# Patient Record
Sex: FEMALE | Race: WHITE
[De-identification: names, ages, dates, MRNs, and addresses within clinical notes are randomized per-mention and may not be internally consistent; named-entity substitution may affect disease eponyms.]

---

## 2017-03-24 ENCOUNTER — HOSPITAL ENCOUNTER (INPATIENT)
Dept: HOSPITAL 61 - ER | Age: 82
LOS: 5 days | Discharge: NURSING, REHAB OR LONG-TERM CARE | DRG: 470 | End: 2017-03-29
Attending: INTERNAL MEDICINE | Admitting: INTERNAL MEDICINE
Payer: MEDICARE

## 2017-03-24 VITALS — HEIGHT: 66 IN | BODY MASS INDEX: 29.34 KG/M2 | WEIGHT: 182.56 LBS
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
## 2017-03-24 DIAGNOSIS — E03.9: ICD-10-CM
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
## 2017-03-24 DIAGNOSIS — M81.0: ICD-10-CM
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
## 2017-03-24 DIAGNOSIS — Z60.2: ICD-10-CM

## 2017-03-24 DIAGNOSIS — Z91.018: ICD-10-CM

## 2017-03-24 DIAGNOSIS — Z88.0: ICD-10-CM

## 2017-03-24 DIAGNOSIS — S42.002A: ICD-10-CM

## 2017-03-24 DIAGNOSIS — S72.002A: Primary | ICD-10-CM

## 2017-03-24 DIAGNOSIS — Z88.8: ICD-10-CM

## 2017-03-24 DIAGNOSIS — I10: ICD-10-CM

## 2017-03-24 LAB
ANION GAP SERPL CALC-SCNC: 10 MMOL/L (ref 6–14)
BACTERIA #/AREA URNS HPF: (no result) /HPF
BASOPHILS # BLD AUTO: 0 X10^3/UL (ref 0–0.2)
BASOPHILS NFR BLD: 0 % (ref 0–3)
BILIRUB UR QL STRIP: NEGATIVE
BUN SERPL-MCNC: 25 MG/DL (ref 7–20)
CALCIUM SERPL-MCNC: 8.8 MG/DL (ref 8.5–10.1)
CHLORIDE SERPL-SCNC: 105 MMOL/L (ref 98–107)
CO2 SERPL-SCNC: 27 MMOL/L (ref 21–32)
CREAT SERPL-MCNC: 1 MG/DL (ref 0.6–1)
EOSINOPHIL NFR BLD: 2 % (ref 0–3)
ERYTHROCYTE [DISTWIDTH] IN BLOOD BY AUTOMATED COUNT: 14.7 % (ref 11.5–14.5)
GFR SERPLBLD BASED ON 1.73 SQ M-ARVRAT: 52.4 ML/MIN
GLUCOSE SERPL-MCNC: 122 MG/DL (ref 70–99)
GLUCOSE UR STRIP-MCNC: NEGATIVE MG/DL
HCT VFR BLD CALC: 39.7 % (ref 36–47)
HGB BLD-MCNC: 13.3 G/DL (ref 12–15.5)
LYMPHOCYTES # BLD: 1.5 X10^3/UL (ref 1–4.8)
LYMPHOCYTES NFR BLD AUTO: 20 % (ref 24–48)
MCH RBC QN AUTO: 29 PG (ref 25–35)
MCHC RBC AUTO-ENTMCNC: 34 G/DL (ref 31–37)
MCV RBC AUTO: 87 FL (ref 79–100)
MONOCYTES NFR BLD: 11 % (ref 0–9)
NEUTROPHILS NFR BLD AUTO: 67 % (ref 31–73)
NITRITE UR QL STRIP: NEGATIVE
PH UR STRIP: 7.5 [PH]
PLATELET # BLD AUTO: 185 X10^3/UL (ref 140–400)
POTASSIUM SERPL-SCNC: 4.1 MMOL/L (ref 3.5–5.1)
PROT UR STRIP-MCNC: NEGATIVE MG/DL
RBC # BLD AUTO: 4.55 X10^6/UL (ref 3.5–5.4)
RBC #/AREA URNS HPF: 0 /HPF (ref 0–2)
SODIUM SERPL-SCNC: 142 MMOL/L (ref 136–145)
SP GR UR STRIP: 1.01
SQUAMOUS #/AREA URNS LPF: (no result) /LPF
UROBILINOGEN UR-MCNC: 0.2 MG/DL
WBC # BLD AUTO: 7.6 X10^3/UL (ref 4–11)
WBC #/AREA URNS HPF: (no result) /HPF (ref 0–4)

## 2017-03-24 PROCEDURE — C9113 INJ PANTOPRAZOLE SODIUM, VIA: HCPCS

## 2017-03-24 PROCEDURE — 80048 BASIC METABOLIC PNL TOTAL CA: CPT

## 2017-03-24 PROCEDURE — 82306 VITAMIN D 25 HYDROXY: CPT

## 2017-03-24 PROCEDURE — 88305 TISSUE EXAM BY PATHOLOGIST: CPT

## 2017-03-24 PROCEDURE — 94760 N-INVAS EAR/PLS OXIMETRY 1: CPT

## 2017-03-24 PROCEDURE — 82947 ASSAY GLUCOSE BLOOD QUANT: CPT

## 2017-03-24 PROCEDURE — 87086 URINE CULTURE/COLONY COUNT: CPT

## 2017-03-24 PROCEDURE — 70450 CT HEAD/BRAIN W/O DYE: CPT

## 2017-03-24 PROCEDURE — 36415 COLL VENOUS BLD VENIPUNCTURE: CPT

## 2017-03-24 PROCEDURE — 87641 MR-STAPH DNA AMP PROBE: CPT

## 2017-03-24 PROCEDURE — 93005 ELECTROCARDIOGRAM TRACING: CPT

## 2017-03-24 PROCEDURE — 85014 HEMATOCRIT: CPT

## 2017-03-24 PROCEDURE — 85018 HEMOGLOBIN: CPT

## 2017-03-24 PROCEDURE — 73030 X-RAY EXAM OF SHOULDER: CPT

## 2017-03-24 PROCEDURE — 88311 DECALCIFY TISSUE: CPT

## 2017-03-24 PROCEDURE — 94250: CPT

## 2017-03-24 PROCEDURE — 84484 ASSAY OF TROPONIN QUANT: CPT

## 2017-03-24 PROCEDURE — 71010: CPT

## 2017-03-24 PROCEDURE — 73502 X-RAY EXAM HIP UNI 2-3 VIEWS: CPT

## 2017-03-24 PROCEDURE — 85027 COMPLETE CBC AUTOMATED: CPT

## 2017-03-24 PROCEDURE — 81001 URINALYSIS AUTO W/SCOPE: CPT

## 2017-03-24 PROCEDURE — 94640 AIRWAY INHALATION TREATMENT: CPT

## 2017-03-24 SDOH — SOCIAL STABILITY - SOCIAL INSECURITY: PROBLEMS RELATED TO LIVING ALONE: Z60.2

## 2017-03-24 NOTE — PHYS DOC
Past Medical History


Past Medical History:  Hypertension, Hypothyroid, Other


Additional Past Medical Histor:  graves


Past Surgical History:  Cholecystectomy, Tonsillectomy, Tubal ligation


Additional Past Surgical Histo:  vein strip


Alcohol Use:  None


Drug Use:  None





Adult General


Chief Complaint


Chief Complaint:  MECHANICAL FALL





HPI


HPI


87-year-old female who states she was sleeping at home when her daughter called 

and try to get to the phone and slipped and fell hitting her head and landing 

on her left side in the left shoulder she states she has significant pain in 

her left shoulder as well as her left hip and pelvis area. Patient has not 

tried to ambulate since her fall. She is fully alert and oriented this time but 

has not taken her evening blood pressure medications. She is satting in the mid 

80s upon EMS arrival and required 2 L of oxygen. Upon my initial assessment, 

the patient is in no acute distress but requiring 3 L. She is normally not 

requiring supplemental oxygen. She states she has history of hypertension but 

is otherwise healthy. She does not normally require any ambulatory assistance. 

She does not use a walker or wheelchair. Denies any significant chest pain. She 

localizes all her pain primarily to the left shoulder made worse with movement.





Review of Systems


Review of Systems





Constitutional: Denies fever or chills []


Eyes: Denies change in visual acuity, redness, or eye pain []


HENT: Denies nasal congestion or sore throat []


Respiratory: Denies cough or shortness of breath []


Cardiovascular: No additional information not addressed in HPI []


GI: Denies abdominal pain, nausea, vomiting, bloody stools or diarrhea []


: Denies dysuria or hematuria []


Musculoskeletal: Denies back pain, has joint pain []


Integument: Denies rash or skin lesions []


Neurologic: Denies headache, focal weakness or sensory changes []


Endocrine: Denies polyuria or polydipsia []





Current Medications


Current Medications





 Current Medications








 Medications


  (Trade)  Dose


 Ordered  Sig/Blanco  Start Time


 Stop Time Status Last Admin


Dose Admin


 


 Acetaminophen


  (Tylenol)  650 mg  1X  ONCE  3/24/17 22:00


 3/24/17 22:01 DC 3/24/17 22:07


650 MG











Allergies


Allergies





 Allergies








Coded Allergies Type Severity Reaction Last Updated Verified


 


  Penicillins Allergy Intermediate  8/22/16 Yes


 


  ibuprofen Allergy Intermediate  8/22/16 Yes











Physical Exam


Physical Exam





Constitutional: Well developed, well nourished, no acute distress, non-toxic 

appearance. []


HENT: Normocephalic, atraumatic, bilateral external ears normal, oropharynx 

moist, no oral exudates, nose normal. []


Eyes: PERRLA, EOMI, conjunctiva normal, no discharge. [] 


Neck: Normal range of motion, no tenderness, supple, no stridor. [] 


Cardiovascular:Heart rate regular rhythm, no murmur []


Lungs & Thorax:  Bilateral breath sounds clear to auscultation, there is no 

crepitus or deformity of the chest []


Abdomen: Bowel sounds normal, soft, no tenderness, no masses, no pulsatile 

masses. [] 


Skin: Warm, dry, no erythema, no rash. [] 


Back: No tenderness, no CVA tenderness. [] 


Extremities: There is no obvious deformity or swelling seen in the lower 

extremities, no cyanosis, no clubbing, ROM intact in left shoulder, no edema. [

] 


Neurologic: Alert and oriented X 3, normal motor function, normal sensory 

function, no focal deficits noted. []


Psychologic: Affect normal, judgement normal, mood normal. []





Current Patient Data


Vital Signs





 Vital Signs








  Date Time  Temp Pulse Resp B/P Pulse Ox O2 Delivery O2 Flow Rate FiO2


 


3/24/17 23:00  66  156/70 94 Nasal Cannula 2 


 


3/24/17 21:31 97.4  18     





 97.4       








Lab Values





 Laboratory Tests








Test


  3/24/17


22:20 3/24/17


22:22


 


Urine Collection Type Unknown   


 


Urine Color Yellow   


 


Urine Clarity Clear   


 


Urine pH 7.5   


 


Urine Specific Gravity 1.015   


 


Urine Protein


  Negativemg/dL


(NEG-TRACE) 


 


 


Urine Glucose (UA)


  Negativemg/dL


(NEG) 


 


 


Urine Ketones (Stick)


  Negativemg/dL


(NEG) 


 


 


Urine Blood


  Negative (NEG)


  


 


 


Urine Nitrite


  Negative (NEG)


  


 


 


Urine Bilirubin


  Negative (NEG)


  


 


 


Urine Urobilinogen Dipstick


  0.2mg/dL (0.2


mg/dL) 


 


 


Urine Leukocyte Esterase Small (NEG)   


 


Urine RBC 0/HPF (0-2)   


 


Urine WBC


  5-10/HPF (0-4)


  


 


 


Urine Squamous Epithelial


Cells Few/LPF  


  


 


 


Urine Renal Epithelial Cells Occ/LPF   


 


Urine Bacteria


  Few/HPF


(0-FEW) 


 


 


Urine Mucus Mod/LPF   


 


White Blood Count


  


  7.6x10^3/uL


(4.0-11.0)


 


Red Blood Count


  


  4.55x10^6/uL


(3.50-5.40)


 


Hemoglobin


  


  13.3g/dL


(12.0-15.5)


 


Hematocrit


  


  39.7%


(36.0-47.0)


 


Mean Corpuscular Volume  87fL ()  


 


Mean Corpuscular Hemoglobin  29pg (25-35)  


 


Mean Corpuscular Hemoglobin


Concent 


  34g/dL (31-37)


 


 


Red Cell Distribution Width


  


  14.7%


(11.5-14.5)  H


 


Platelet Count


  


  185x10^3/uL


(140-400)


 


Neutrophils (%) (Auto)  67% (31-73)  


 


Lymphocytes (%) (Auto)  20% (24-48)  L


 


Monocytes (%) (Auto)  11% (0-9)  H


 


Eosinophils (%) (Auto)  2% (0-3)  


 


Basophils (%) (Auto)  0% (0-3)  


 


Neutrophils # (Auto)


  


  5.1x10^3uL


(1.8-7.7)


 


Lymphocytes # (Auto)


  


  1.5x10^3/uL


(1.0-4.8)


 


Monocytes # (Auto)


  


  0.8x10^3/uL


(0.0-1.1)


 


Eosinophils # (Auto)


  


  0.2x10^3/uL


(0.0-0.7)


 


Basophils # (Auto)


  


  0.0x10^3/uL


(0.0-0.2)


 


Sodium Level


  


  142mmol/L


(136-145)


 


Potassium Level


  


  4.1mmol/L


(3.5-5.1)


 


Chloride Level


  


  105mmol/L


()


 


Carbon Dioxide Level


  


  27mmol/L


(21-32)


 


Anion Gap  10 (6-14)  


 


Blood Urea Nitrogen


  


  25mg/dL (7-20)


H


 


Creatinine


  


  1.0mg/dL


(0.6-1.0)


 


Estimated GFR


(Cockcroft-Gault) 


  52.4  


 


 


Glucose Level


  


  122mg/dL


(70-99)  H


 


Calcium Level


  


  8.8mg/dL


(8.5-10.1)


 


Troponin I Quantitative


  


  < 0.017ng/mL


(0.000-0.055)





 Laboratory Tests


3/24/17 22:22








 Laboratory Tests


3/24/17 22:22











EKG


EKG


EKG as interpreted by me shows a sinus rhythm with a rate of 68 bpm. There are 

no obvious ischemic findings. There is LVH criteria. This EKG does not meet 

STEMI criteria.





Radiology/Procedures


Radiology/Procedures


CT of the head without contrast demonstrates the following:


PROCEDURE 


CT head without contrast. 


 


HISTORY 


Fall. Headache. 


 


TECHNIQUE 


Noncontrast CT head was obtained. 


One or more of the following individualized dose reduction techniques were


utilized for this exam: 1. Automated exposure control. 2. Adjustment of 


the mA and/or kV according to patient's size. 3. Use of iterative 


reconstruction technique. 


 


COMPARISON 


August 22, 2016. 


 


FINDINGS 


There is prominence of the ventricles and sulci. There is mild probable 


small-vessel ischemic disease. There is no acute intracranial hemorrhage 


or extra-axial fluid collection. There is no mass effect or midline shift.


Gray-white differentiation is preserved. Vascular calcifications are 


noted. Opacified right maxillary sinus with expansion again may be related


to mucocele. Chronic left maxillary sinusitis is noted. Right ethmoid air 


cells are opacified. 


 


IMPRESSION 


No acute intracranial findings. Stable examination. 





Portable AP view of the left hip and pelvis appears stable with no obvious 

fracture or bony abnormality. It shows chronic degenerative change. Portable AP 

view of the chest as reviewed by me showed a possible left distal clavicular 

fracture but no other obvious finding with chronic changes to both lung fields 

but no pneumothorax or any obvious rib fracture. Left shoulder films did not 

demonstrate any acute bony abnormality other than a possible left distal 

clavicular fracture.





Course & Med Decision Making


Course & Med Decision Making


Pertinent Labs and Imaging studies reviewed. (See chart for details)





This fairly robust 87-year-old female who had a fall that she attributes to 

trying to get to the phone swiftly and falling onto tile surface will have a 

head CT as well as a portable view of her chest, left shoulder and left hip and 

pelvis to rule out any acute fracture. She is still requiring supplemental 

oxygen at this time and will likely need to be admitted for this reason. She 

likely has a pulmonary contusion or rib fracture. Her laboratory workup was 

essentially unremarkable. She has continuing acquired submental oxygen which is 

likely secondary to her underlying lung disease as well as possible chest wall 

contusion and for this reason I'll be admitting her for further evaluation and 

treatment. Her pain was well-controlled with Tylenol only on the department. 

Need for her admission will be discussed with the hospitalist, Dr. Taylor, who 

will evaluate further for her hypoxia.





Dragon Disclaimer


Dragon Disclaimer


This electronic medical record was generated, in whole or in part, using a 

voice recognition dictation system.





Departure


Departure


Impression:  


 Primary Impression:  


 Injury of clavicle


 Additional Impressions:  


 Hypoxia


 Fall


Disposition:  09 ADMITTED AS INPATIENT


Admitting Physician:  Geraldine Taylor


Condition:  STABLE


Referrals:  


UNKNOWN PCP NAME (PCP)





Problem Qualifiers








[place] PIÑA DO Mar 24, 2017 21:59

## 2017-03-24 NOTE — RAD
PROCEDURE 

CT head without contrast. 

 

HISTORY 

Fall. Headache. 

 

TECHNIQUE 

Noncontrast CT head was obtained. 

One or more of the following individualized dose reduction techniques were

utilized for this exam: 1. Automated exposure control. 2. Adjustment of 

the mA and/or kV according to patient's size. 3. Use of iterative 

reconstruction technique. 

 

COMPARISON 

August 22, 2016. 

 

FINDINGS 

There is prominence of the ventricles and sulci. There is mild probable 

small-vessel ischemic disease. There is no acute intracranial hemorrhage 

or extra-axial fluid collection. There is no mass effect or midline shift.

Gray-white differentiation is preserved. Vascular calcifications are 

noted. Opacified right maxillary sinus with expansion again may be related

to mucocele. Chronic left maxillary sinusitis is noted. Right ethmoid air 

cells are opacified. 

 

IMPRESSION 

No acute intracranial findings. Stable examination. 

 

Electronically signed by: Gatito Livingston MD (Mar 24, 2017 23:41:36)

## 2017-03-25 VITALS — SYSTOLIC BLOOD PRESSURE: 158 MMHG | DIASTOLIC BLOOD PRESSURE: 63 MMHG

## 2017-03-25 VITALS — SYSTOLIC BLOOD PRESSURE: 141 MMHG | DIASTOLIC BLOOD PRESSURE: 59 MMHG

## 2017-03-25 VITALS — DIASTOLIC BLOOD PRESSURE: 82 MMHG | SYSTOLIC BLOOD PRESSURE: 156 MMHG

## 2017-03-25 VITALS — DIASTOLIC BLOOD PRESSURE: 59 MMHG | SYSTOLIC BLOOD PRESSURE: 143 MMHG

## 2017-03-25 VITALS — DIASTOLIC BLOOD PRESSURE: 56 MMHG | SYSTOLIC BLOOD PRESSURE: 150 MMHG

## 2017-03-25 VITALS — DIASTOLIC BLOOD PRESSURE: 52 MMHG | SYSTOLIC BLOOD PRESSURE: 130 MMHG

## 2017-03-25 RX ADMIN — INSULIN ASPART SCH UNITS: 100 INJECTION, SOLUTION INTRAVENOUS; SUBCUTANEOUS at 12:00

## 2017-03-25 RX ADMIN — TRAMADOL HYDROCHLORIDE PRN MG: 50 TABLET, COATED ORAL at 10:26

## 2017-03-25 RX ADMIN — SODIUM CHLORIDE SCH MLS/HR: 0.45 INJECTION, SOLUTION INTRAVENOUS at 10:46

## 2017-03-25 RX ADMIN — INSULIN ASPART SCH UNITS: 100 INJECTION, SOLUTION INTRAVENOUS; SUBCUTANEOUS at 17:00

## 2017-03-25 RX ADMIN — ACETAMINOPHEN PRN MG: 325 TABLET ORAL at 21:11

## 2017-03-25 RX ADMIN — SODIUM CHLORIDE SCH MLS/HR: 0.45 INJECTION, SOLUTION INTRAVENOUS at 21:04

## 2017-03-25 RX ADMIN — TRAMADOL HYDROCHLORIDE PRN MG: 50 TABLET, COATED ORAL at 18:09

## 2017-03-25 RX ADMIN — TRAMADOL HYDROCHLORIDE PRN MG: 50 TABLET, COATED ORAL at 03:38

## 2017-03-25 RX ADMIN — ACETAMINOPHEN PRN MG: 325 TABLET ORAL at 17:02

## 2017-03-25 RX ADMIN — METOPROLOL SUCCINATE SCH MG: 50 TABLET, FILM COATED, EXTENDED RELEASE ORAL at 21:04

## 2017-03-25 RX ADMIN — LOSARTAN POTASSIUM SCH MG: 50 TABLET, FILM COATED ORAL at 21:03

## 2017-03-25 RX ADMIN — LEVOTHYROXINE SODIUM SCH MCG: 125 TABLET ORAL at 10:25

## 2017-03-25 RX ADMIN — AMLODIPINE BESYLATE SCH MG: 5 TABLET ORAL at 10:24

## 2017-03-25 NOTE — RAD
Indication fall, pain.



An AP view and 2Y views of the left shoulder were obtained.



There is probable bony demineralization. There is a subtle linear lucency

involving the distal clavicle suggesting a possible nondisplaced fracture. The

finding is not certain and could be artifactual. Clinical correlation as to

the possibility of a traumatic nondisplaced fracture involving the distal

clavicle advised



IMPRESSION: Suspect nondisplaced fracture distal clavicle

## 2017-03-25 NOTE — PDOC1
History and Physical


Date of Admission


Date of Admission


DATE: 3/25/17 


TIME: 14:32





Identification/Chief Complaint


Chief Complaint


mechanical fall





Source


Source:  Caregiver, Chart review, Patient





History of Present Illness


History of Present Illness


very pleasant and fully functional 87 y.o, lives at home alone, ambulates 

without assistive device had a mechanical fall last night, SHe was talking on 

the phone, was on her doorway which was tiled and fell on her left side hitting 

her left clavicle and left hip,. NO head trauma, no LOC, At ER. images did show 

left femoral neck fx, and left distal clavicular fx, Dry mouth, has been NPO 

since admission, but with IVF running at 75cc.hr


No known heart or lung probs. 


NOn smoker, non drinker, Rather very healthy for stated age,


FAmily involved in her care





Past Medical History


Cardiovascular:  HTN


Endocrine:  Hypothyroidism





Past Surgical History


Past Surgical History:  Other, No pertinent history





Family History


Family History:  No Significant





Social History


Smoke:  No


ALCOHOL:  none





Current Problem List


Problem List


 Problems


Medical Problems:


(1) Fall


Status: Acute  





(2) Hypoxia


Status: Acute  





(3) Injury of clavicle


Status: Acute  








Problems:  





Current Medications


Current Medications





 Current Medications


Acetaminophen (Tylenol) 650 mg 1X  ONCE PO  Last administered on 3/24/17at 22:07

;  Start 3/24/17 at 22:00;  Stop 3/24/17 at 22:01;  Status DC


Metoprolol Tartrate (Lopressor) 50 mg 1X  ONCE PO  Last administered on 3/24/

17at 23:43;  Start 3/24/17 at 23:30;  Stop 3/24/17 at 23:31;  Status DC


Ondansetron HCl (Zofran) 4 mg PRN Q8HRS  PRN IV NAUSEA/VOMITING;  Start 3/24/17 

at 23:30;  Stop 3/25/17 at 09:50;  Status DC


Acetaminophen (Tylenol) 650 mg PRN Q4HRS  PRN PO FEVER;  Start 3/24/17 at 23:30

;  Stop 3/25/17 at 23:29


Pneumococcal Polyvalent Vaccine (Do NOT chart on this placeholder) 1 each 1X  

ONCE MC ;  Start 3/25/17 at 03:00;  Stop 3/25/17 at 03:01;  Status UNV


Pneumococcal Polyvalent Vaccine (Pneumovax 23) 0.5 ml ONCE ONCE VAX IM ;  Start 

3/25/17 at 09:00;  Stop 3/25/17 at 09:01;  Status DC


Tramadol HCl (Ultram) 50 mg PRN Q6HRS  PRN PO PAIN Last administered on 3/25/

17at 10:26;  Start 3/25/17 at 03:30


Ondansetron HCl (Zofran) 4 mg PRN Q6HRS  PRN IV NAUSEA/VOMITING 1ST CHOICE;  

Start 3/25/17 at 09:48


Insulin Aspart (Novolog) 0-9 UNITS TIDWMEALS SQ ;  Start 3/25/17 at 12:00


Dextrose 12.5 gm PRN Q15MIN  PRN IV SEE COMMENTS;  Start 3/25/17 at 10:00


Amlodipine Besylate (Norvasc) 5 mg DAILY PO  Last administered on 3/25/17at 10:

24;  Start 3/25/17 at 10:30


Levothyroxine Sodium (Synthroid) 125 mcg DAILY07 PO  Last administered on 3/25/

17at 10:25;  Start 3/25/17 at 10:30


Losartan Potassium (Cozaar) 50 mg HS PO ;  Start 3/25/17 at 21:00


Metoprolol Succinate 50 mg 50 mg HS PO ;  Start 3/25/17 at 21:00


Sodium Chloride (Iv Sodium Chloride 0.45%) 1,000 ml @  75 mls/hr [id number] IV  

Last administered on 3/25/17at 10:46;  Start 3/25/17 at 10:00





Active Scripts


Active


Reported


Toprol Xl (Metoprolol Succinate) 50 Mg Tab.er.24h 1 Tab PO HS


Losartan Potassium 50 Mg Tablet 50 Mg PO HS


Norvasc (Amlodipine Besylate) 5 Mg Tablet 1 Tab PO DAILY


Levothyroxine Sodium 125 Mcg Tablet 1 Tab PO DAILY





Allergies


Allergies:  


Coded Allergies:  


     Penicillins (Verified  Allergy, Intermediate, 8/22/16)


     ibuprofen (Verified  Allergy, Intermediate, 8/22/16)


     banana (Verified  Allergy, Unknown, 3/25/17)


     honey (Verified  Allergy, Unknown, 3/25/17)





ROS


General:  No: Appetite, Chills, Fatigue, Malaise, Night Sweats, Other


PSYCHOLOGICAL ROS:  No: Anxiety, Behavioral Disorder, Concentration difficultie

, Decreased libido, Depression, Disorientation, Hallucinations, Hostility, 

Irritablity, Memory difficulties, Mood Swings, Obsessive thoughts, Other, 

Physical abuse, Sexual abuse, Sleep disturbances, Suicidal ideation


Eyes:  No Blurry vision, No Decreased vision, No Double vision, No Dry eyes, No 

Excessive tearing, No Eye Pain, No Itchy Eyes, No Loss of vision, No Other, No 

Photophobia, No Scotomata, No Uses contacts, No Uses glasses


HEENT:  No: Epistaxis, Heacaches, Hearing change, Nasal congestion, Nasal 

discharge, Oral lesions, Other, Sinus pain, Sneezing, Snoring, Sore Throat, 

Tinnitus, Vertigo, Visual Changes, Vocal changes


ALLERGY AND IMMUNOLOGY:  No: Hives, Insect Bite Sensitivity, Itchy/Watery Eyes, 

Nasal Congestion, Other, Post Nasal Drip, Seasonal Allergies


Hematological and Lymphatic:  No: Bleeding Problems, Blood Clots, Blood 

Transfusions, Brusing, Night Sweats, Other, Pallor, Swollen Lymph Nodes


ENDOCRINE:  No: Breast Changes, Galactorrhea, Hair Pattern Changes, Hot Flashes

, Malaise/lethargy, Mood Swings, Other, Palpitations, Polydipsia/polyuria, Skin 

Changes, Temperature Intolerance, Unexpected Weight Changes


Breast:  No New/Changing Breast Lumps, No Nipple changes, No Nipple discharge, 

No Other


Respiratory:  No: Cough, Hemoptysis, Orthopnea, Other, Pleuritic Pain, SOB with 

excertion, Shortness of breath, Sputum Changes, Stridor, Tachypnea, Wheezing


Cardiovascular:  No Chest Pain, No Edema, No Lt Headedness, No Orthopnea, No 

Other, No Palpitations, No Paroxysmal Noc. Dyspnea


Gastrointestinal:  No Abdominal Pain, No Constipation, No Diarrhea, No 

Hematochezia, No Melena, No Nausea, No Other, No Vomiting


Genitourinary:  No , No , No , No , No , No , No , No Discharge, No Dysuria, No 

Flank Pain, No Frequency, No Hematuria, No Incontinence, No Other, No Pain, No 

Retention, No Urgency


Musculoskeletal:  No Gait Disturbance, No Joint Pain, No Joint Stiffness, No 

Joint Swelling, No Muscle Pain, No Muscular Weakness, No Other, No Pain In:, No 

Swelling In:


Neurological:  No Behavorial Changes, No Bowel/Bladder ControlChng, No Confusion

, No Dizziness, No Gait Disturbance, No Headaches, No Impaired Coord/balance, 

No Memory Loss, No Numbness/Tingling, No Other, No Seizures, No Speech Problems

, No Tremors, No Visual Changes, No Weakness


Skin:  No Acne, No Dry Skin, No Eczema, No Hair Changes, No Lumps, No Mole 

Changes, No Mottling, No Nail Changes, No Other, No Pruritus, No Rash, No Skin 

Lesion Changes





Physical Exam


General:  Alert, Oriented X3, Cooperative, No acute distress


HEENT:  Atraumatic, PERRLA, EOMI


Lungs:  Clear to auscultation, Normal air movement


Heart:  S1S2, RRR, no thrills, no rubs, no gallops


Cardiovascular:  S1, S2


Breasts:  Normal, Rt breast nml w/o mass, Lt breast nml w/o mass, Nipples normal


Abdomen:  Normal bowel sounds, Soft, No tenderness, No hepatosplenomegaly, No 

masses


Extremities:  Other (left leg is externally rotated)


Skin:  No rashes, No breakdown, No significant lesion


Neuro:  Normal gait, Normal speech, Strength at 5/5 X4 ext, Normal tone, 

Sensation intact, Cranial nerves 3-12 NL, Reflexes 2+


Psych/Mental Status:  Mental status NL, Mood NL





Vitals


Vitals





 Vital Signs








  Date Time  Temp Pulse Resp B/P Pulse Ox O2 Delivery O2 Flow Rate FiO2


 


3/25/17 11:26   20  94 Nasal Cannula 2.0 


 


3/25/17 11:00 99.1 67  150/56    





 99.1       











Labs


Labs





Laboratory Tests








Test


  3/24/17


22:20 3/24/17


22:22 3/25/17


11:51


 


Urine Collection Type Unknown   


 


Urine Color Yellow   


 


Urine Clarity Clear   


 


Urine pH 7.5   


 


Urine Specific Gravity 1.015   


 


Urine Protein


  Negativemg/dL


(NEG-TRACE) 


  


 


 


Urine Glucose (UA)


  Negativemg/dL


(NEG) 


  


 


 


Urine Ketones (Stick)


  Negativemg/dL


(NEG) 


  


 


 


Urine Blood Negative (NEG)   


 


Urine Nitrite Negative (NEG)   


 


Urine Bilirubin Negative (NEG)   


 


Urine Urobilinogen Dipstick


  0.2mg/dL (0.2


mg/dL) 


  


 


 


Urine Leukocyte Esterase Small (NEG)   


 


Urine RBC 0/HPF (0-2)   


 


Urine WBC 5-10/HPF (0-4)   


 


Urine Squamous Epithelial


Cells Few/LPF 


  


  


 


 


Urine Renal Epithelial Cells Occ/LPF   


 


Urine Bacteria


  Few/HPF


(0-FEW) 


  


 


 


Urine Mucus Mod/LPF   


 


White Blood Count


  


  7.6x10^3/uL


(4.0-11.0) 


 


 


Red Blood Count


  


  4.55x10^6/uL


(3.50-5.40) 


 


 


Hemoglobin


  


  13.3g/dL


(12.0-15.5) 


 


 


Hematocrit


  


  39.7%


(36.0-47.0) 


 


 


Mean Corpuscular Volume  87fL ()  


 


Mean Corpuscular Hemoglobin  29pg (25-35)  


 


Mean Corpuscular Hemoglobin


Concent 


  34g/dL (31-37) 


  


 


 


Red Cell Distribution Width


  


  14.7%


(11.5-14.5) 


 


 


Platelet Count


  


  185x10^3/uL


(140-400) 


 


 


Neutrophils (%) (Auto)  67% (31-73)  


 


Lymphocytes (%) (Auto)  20% (24-48)  


 


Monocytes (%) (Auto)  11% (0-9)  


 


Eosinophils (%) (Auto)  2% (0-3)  


 


Basophils (%) (Auto)  0% (0-3)  


 


Neutrophils # (Auto)


  


  5.1x10^3uL


(1.8-7.7) 


 


 


Lymphocytes # (Auto)


  


  1.5x10^3/uL


(1.0-4.8) 


 


 


Monocytes # (Auto)


  


  0.8x10^3/uL


(0.0-1.1) 


 


 


Eosinophils # (Auto)


  


  0.2x10^3/uL


(0.0-0.7) 


 


 


Basophils # (Auto)


  


  0.0x10^3/uL


(0.0-0.2) 


 


 


Sodium Level


  


  142mmol/L


(136-145) 


 


 


Potassium Level


  


  4.1mmol/L


(3.5-5.1) 


 


 


Chloride Level


  


  105mmol/L


() 


 


 


Carbon Dioxide Level


  


  27mmol/L


(21-32) 


 


 


Anion Gap  10 (6-14)  


 


Blood Urea Nitrogen  25mg/dL (7-20)  


 


Creatinine


  


  1.0mg/dL


(0.6-1.0) 


 


 


Estimated GFR


(Cockcroft-Gault) 


  52.4 


  


 


 


Glucose Level


  


  122mg/dL


(70-99) 


 


 


Calcium Level


  


  8.8mg/dL


(8.5-10.1) 


 


 


Troponin I Quantitative


  


  < 0.017ng/mL


(0.000-0.055) 


 


 


Glucose (Fingerstick)


  


  


  125mg/dL


(70-99)








Laboratory Tests








Test


  3/24/17


22:20 3/24/17


22:22 3/25/17


11:51


 


Urine Collection Type Unknown   


 


Urine Color Yellow   


 


Urine Clarity Clear   


 


Urine pH 7.5   


 


Urine Specific Gravity 1.015   


 


Urine Protein


  Negativemg/dL


(NEG-TRACE) 


  


 


 


Urine Glucose (UA)


  Negativemg/dL


(NEG) 


  


 


 


Urine Ketones (Stick)


  Negativemg/dL


(NEG) 


  


 


 


Urine Blood Negative (NEG)   


 


Urine Nitrite Negative (NEG)   


 


Urine Bilirubin Negative (NEG)   


 


Urine Urobilinogen Dipstick


  0.2mg/dL (0.2


mg/dL) 


  


 


 


Urine Leukocyte Esterase Small (NEG)   


 


Urine RBC 0/HPF (0-2)   


 


Urine WBC 5-10/HPF (0-4)   


 


Urine Squamous Epithelial


Cells Few/LPF 


  


  


 


 


Urine Renal Epithelial Cells Occ/LPF   


 


Urine Bacteria


  Few/HPF


(0-FEW) 


  


 


 


Urine Mucus Mod/LPF   


 


White Blood Count


  


  7.6x10^3/uL


(4.0-11.0) 


 


 


Red Blood Count


  


  4.55x10^6/uL


(3.50-5.40) 


 


 


Hemoglobin


  


  13.3g/dL


(12.0-15.5) 


 


 


Hematocrit


  


  39.7%


(36.0-47.0) 


 


 


Mean Corpuscular Volume  87fL ()  


 


Mean Corpuscular Hemoglobin  29pg (25-35)  


 


Mean Corpuscular Hemoglobin


Concent 


  34g/dL (31-37) 


  


 


 


Red Cell Distribution Width


  


  14.7%


(11.5-14.5) 


 


 


Platelet Count


  


  185x10^3/uL


(140-400) 


 


 


Neutrophils (%) (Auto)  67% (31-73)  


 


Lymphocytes (%) (Auto)  20% (24-48)  


 


Monocytes (%) (Auto)  11% (0-9)  


 


Eosinophils (%) (Auto)  2% (0-3)  


 


Basophils (%) (Auto)  0% (0-3)  


 


Neutrophils # (Auto)


  


  5.1x10^3uL


(1.8-7.7) 


 


 


Lymphocytes # (Auto)


  


  1.5x10^3/uL


(1.0-4.8) 


 


 


Monocytes # (Auto)


  


  0.8x10^3/uL


(0.0-1.1) 


 


 


Eosinophils # (Auto)


  


  0.2x10^3/uL


(0.0-0.7) 


 


 


Basophils # (Auto)


  


  0.0x10^3/uL


(0.0-0.2) 


 


 


Sodium Level


  


  142mmol/L


(136-145) 


 


 


Potassium Level


  


  4.1mmol/L


(3.5-5.1) 


 


 


Chloride Level


  


  105mmol/L


() 


 


 


Carbon Dioxide Level


  


  27mmol/L


(21-32) 


 


 


Anion Gap  10 (6-14)  


 


Blood Urea Nitrogen  25mg/dL (7-20)  


 


Creatinine


  


  1.0mg/dL


(0.6-1.0) 


 


 


Estimated GFR


(Cockcroft-Gault) 


  52.4 


  


 


 


Glucose Level


  


  122mg/dL


(70-99) 


 


 


Calcium Level


  


  8.8mg/dL


(8.5-10.1) 


 


 


Troponin I Quantitative


  


  < 0.017ng/mL


(0.000-0.055) 


 


 


Glucose (Fingerstick)


  


  


  125mg/dL


(70-99)











VTE Prophylaxis Ordered


VTE Prophylaxis Devices:  Yes


VTE Pharmacological Prophylaxi:  Yes





Assessment/Plan


Assessment/Plan


1. Left fem neck fx, closed


2. Left distal clavicular fx, closed


3. MEchanical fall


4. HTN, controlled


5. Hypothyroidism on synthroid





PLAN


Liquid diet today, NPO post MN


Await ortho consult


Check vit D levels


MAy inc iVF to 100cc.hr


Resume antihypertensives and synthroid


Dw pt and family and RN


Dw RAdiologist - misread - it is femoral neck fx not humeral fx








KATT FRAGA MD Mar 25, 2017 14:42

## 2017-03-25 NOTE — [facility]
Admission Forms Criteria





               MUSCULOSKELETAL DISEASE GRG





Clinical Indications for Admission to Inpatient Care





                                                            (Place 'X' for any 

and all applicable criteria):





Hospital admission is needed for appropriate care of the patient because of ANY 

ONE of the following:





[X]I.    Fracture, dislocation, or other musculoskeletal injury requiring 

inpatient care(medical) as indicated 


         by ANY ONE of the following(4)(5)(6)(7)


         [ ]a)  Vertebral fracture requiring observation for instability or 

neurologic compromise (8)


         [ ]b)  Compartment syndrome (proven or cannot be ruled out during 

observation level of care) (9)


         [ ]c)  Limb-threatening injury


         [ ]d)  Major injury requiring inpatient stabilization such as traction 

initiation or external fixation 


                 before internal fixation or closure of complex or open fracture


         [X]e)  Major injury requiring inpatient treatment after emergency or 

observation level care (as appropriate)


         [ ]f)   Severe pain requiring acute inpatient management


[ ]II.    Newly diagnosed or suspected bone, joint, or orthopedic device 

infection (e.g., osteomyelitis, septic arthritis) 


          needing ANY ONE of the following(1)(2)(3)


          [ ]a)   IV antibiotics that cannot be initiated in other than 

inpatient setting (e.g., patient too unstable or


                   home infusion not available)


          [ ]b)   Device removal or replacement


          [ ]c)   Bone or soft tissue debridement


          [ ]d)   Joint drainage (drain placement or repetitive aspirations)


[ ]III.    Severe rheumatologic disease (e.g., systemic lupus erythematosus, 

rheumatoid arthritis) with complications


          or comorbidities (Also use Optimal Recovery Care Criteria or General 

Recovery Criteria as appropriate on the 


          basis of predominant condition), including ANY ONE of the following(10

)(11)(12)(13)


          [ ]a)  Severe infection (e.g., CNS infection, sepsis) (14)


          [ ]b)  Respiratory complications, including ANY ONE of the following:


                  [ ]i)     Pleural effusion with respiratory compromise      


                  [ ]ii)    Pulmonary hypertension with congestive failure 


                  [ ]iii)   Respiratory failure


                  [ ]iv)   Pulmonary hemorrhage (15)


           [ ]c) Hematologic disease, including ANY ONE of the following:


                 [ ]i)     Coagulopathy with bleeding        


                 [ ]ii)    Thrombosis with hypercoagulable state      


                 [ ]iii)   Thrombotic thrombocytopenic purpura 


           [ ]d) Cerebritis with seizures, psychosis, or other severe 

abnormalities


           [ ]e) Vertebral destruction with monitoring needed for cervical 

myelopathy& possible respiratory compromise


           [ ]f)  Exacerbation that requires inpatient treatment (e.g., 

intravenous immunosuppression) (16)


           [ ]g) Acute renal failure


[ ]IV. Severe vasculitis with complications or comorbidities (Also use Optimal 

Recovery Care Criteria or


        General Recovery Criteria as appropriate on the basis of predominant 

condition), including


        ANY ONE of the following(11)(12)(17)(18)(19)(20)


        [ ]a)  CNS vasculitis with seizures, psychosis, or other severe 

abnormalities (22)


        [ ]b)  Renal failure (16)                                              

                             


        [ ]c)  Pulmonary hemorrhage (15)


        [ ]d)  Cerebral infarction                                             


        [ ]e)  Gastrointestinal ischemia 


        [ ]f)   Gangrene or threatened amputation


        [ ]g)  Exacerbation that requires inpatient treatment (e.g., 

intravenous immunosuppression) (19)(21)


[ ]V.  Severe myopathy as indicated by ANY ONE of the following (28)(29)


        [ ]a)  New onset of airway compromise or inability to swallow


        [ ]b)  Respiratory deterioration with observation needed for impending 

respiratory failure


        [ ]c)  Exacerbation that requires inpatient treatment (e.g., 

intravenous immunosuppression) 


[ ]VI. Severe gout (crystal arthropathy) as indicated by ANY ONE of the 

following (23)(24)


        [ ]a)  Severe pain requiring acute inpatient management


        [ ]b)  Exacerbation that requires inpatient treatment (e.g., 

intravenous treatment) 


[ ]VII.Rhabdomyolysis and ANY ONE of the following (25)(26)(27)


        [ ]a)  Acute renal failure


        [ ]b)  Need for intravenous hydration after emergency or observation 

level care (as appropriate)


        [ ]c)  Inability to maintain oral hydration


        [ ]d)  Change in mental status


        [ ]e)  Electrolyte abnormality that remains after emergency or 

observation level care (as appropriate) 


[ ]VIII Post amputation complication, as indicated by ANY ONE of the following 


         [ ]a) Infection


         [ ]b) Dehiscence


         [ ]c) Myodesis failure        


[ ]IX. Severe pain requiring acute inpatient management as indicated by ALL of 

the following (30)(31)(32)


        [ ]a)  Continuous or frequent (e.g., every 2 to 4 hrs) parenteral 

analgesics required [A]


        [ ]b)  Rapid improvement expected from treatment or acute intervention (

e.g., surgery, anesthesia procedure[B]


[ ]X.  Musculoskeletal Disease and ALL of the following:


        [ ]a)  Symptom or finding for which emergency and observation care have 

failed or are not considered 


                appropriate (Use General Criteria: Observation Care as 

appropriate)


        [ ]b)  Presence of ANY ONE of the following


               [ ]i)   A General Admission Criteria    


               [ ]ii)  A Pediatric General Admission Criteria 











The original [facility] content created by [facility] has been revised. 


The portions of the content which have been revised are identified through the 

use of italic text or in bold, and [facility] 


has neither reviewed nor approved the modified material. All other unmodified 

content is copyright  [facility].





Please see references footnoted in the original [facility] edition 

2016


Admission Criteria Met?:  Yes








JERRY GREGG Mar 25, 2017 03:02

## 2017-03-25 NOTE — EKG
[facility]

              [address] [place] [address]

Test Date:    2017               Test Time:    22:00:36

Pat Name:     LAURI SUGGS            Department:   

Patient ID:   [id number]           Room:         506 

Gender:       F                        Technician:   

:          [id number]               Requested By: FLOR[place] PIÑA

Order Number: [id number]            Reading MD:   Haris Smith

                                 Measurements

Intervals                              Axis          

Rate:         68                       P:            69

RI:           212                      QRS:          40

QRSD:         84                       T:            81

QT:           396                                    

QTc:          426                                    

                           Interpretive Statements

SINUS RHYTHM

LVH WITH REPOLARIZATION ABNORMALITY

QRS(T) CONTOUR ABNORMALITY

CONSIDER ANTEROLATERAL MYOCARDIAL DAMAGE

ABNORMAL ECG



Electronically Signed On 4-3-2017 16:35:33 CDT by Haris Smith

## 2017-03-25 NOTE — PDOC2
CONSULT


Date of Consult


Date of Consult


DATE: 3/25/17 


TIME: 16:42





Reason for Consult


Reason for Consult:


Left hip fracture and left shoulder fracture. I was initially consult yesterday 

for impacted humeral neck fracture.





Identification/Chief Complaint


Chief Complaint


Left hip and shoulder pain





Source


Source:  Chart review, Patient





History of Present Illness


Reason for Visit:


This 87-year-old woman fell at home yesterday. She was talking on the phone, 

and slipped on the tile floor in the doorway. She had immediate hip and 

shoulder pain and was unable to get up or ambulate. She was brought to the 

hospital by EMS. She has been at bed rest since yesterday, and is unable to 

move the leg at all. She still has shoulder pain. She did hit her head but did 

not have loss of consciousness, and the CT scan was negative for acute 

intracranial findings. She normally lives alone at home. Her  was a 

physician, and he  about a year and a half ago. I believe her son is Phillip Rudolph the nurse on [address]. She still is able to drive although she says she 

doesn't like driving "[place]town". She does have a history of poor bone quality. 

She had an x-ray 25 years ago that noted demineralized bone. She had hand 

surgery a few years ago and was told by the surgeon her bone quality was 

surprisingly bad.





Past Medical History


Past Medical History


Chronic sinus problems. Osteopenia/osteoporosis by her history.


Cardiovascular:  HTN


Endocrine:  Hypothyroidism





Past Surgical History


Past Surgical History:  Other, No pertinent history





Family History


Family History:  No Significant





Social History


Social History


She lives alone but has quite a few family members who help. She has another 

son who is an emergency room physician.


No


ALCOHOL:  none


Lives:  Alone





Current Problem List


Problem List


 Problems


Medical Problems:


(1) Fall


Status: Acute  





(2) Hypoxia


Status: Acute  





(3) Injury of clavicle


Status: Acute  











Current Medications


Current Medications





 Current Medications


Acetaminophen (Tylenol) 650 mg 1X  ONCE PO  Last administered on 3/24/17at 22:07

;  Start 3/24/17 at 22:00;  Stop 3/24/17 at 22:01;  Status DC


Metoprolol Tartrate (Lopressor) 50 mg 1X  ONCE PO  Last administered on 3/24/

17at 23:43;  Start 3/24/17 at 23:30;  Stop 3/24/17 at 23:31;  Status DC


Ondansetron HCl (Zofran) 4 mg PRN Q8HRS  PRN IV NAUSEA/VOMITING;  Start 3/24/17 

at 23:30;  Stop 3/25/17 at 09:50;  Status DC


Acetaminophen (Tylenol) 650 mg PRN Q4HRS  PRN PO FEVER;  Start 3/24/17 at 23:30

;  Stop 3/25/17 at 23:29


Pneumococcal Polyvalent Vaccine (Do NOT chart on this placeholder) 1 each 1X  

ONCE MC ;  Start 3/25/17 at 03:00;  Stop 3/25/17 at 03:01;  Status UNV


Pneumococcal Polyvalent Vaccine (Pneumovax 23) 0.5 ml ONCE ONCE VAX IM ;  Start 

3/25/17 at 09:00;  Stop 3/25/17 at 09:01;  Status DC


Tramadol HCl (Ultram) 50 mg PRN Q6HRS  PRN PO PAIN Last administered on 3/25/

17at 10:26;  Start 3/25/17 at 03:30


Ondansetron HCl (Zofran) 4 mg PRN Q6HRS  PRN IV NAUSEA/VOMITING 1ST CHOICE;  

Start 3/25/17 at 09:48


Insulin Aspart (Novolog) 0-9 UNITS TIDWMEALS SQ ;  Start 3/25/17 at 12:00


Dextrose 12.5 gm PRN Q15MIN  PRN IV SEE COMMENTS;  Start 3/25/17 at 10:00


Amlodipine Besylate (Norvasc) 5 mg DAILY PO  Last administered on 3/25/17at 10:

24;  Start 3/25/17 at 10:30


Levothyroxine Sodium (Synthroid) 125 mcg DAILY07 PO  Last administered on 3/25/

17at 10:25;  Start 3/25/17 at 10:30


Losartan Potassium (Cozaar) 50 mg HS PO ;  Start 3/25/17 at 21:00


Metoprolol Succinate 50 mg 50 mg HS PO ;  Start 3/25/17 at 21:00


Sodium Chloride (Iv Sodium Chloride 0.45%) 1,000 ml @  100 mls/hr Q10H IV  Last 

administered on 3/25/17at 10:46;  Start 3/25/17 at 10:00


Pantoprazole Sodium (Protonix Vial) 40 mg DAILYAC IVP ;  Start 3/26/17 at 07:30


Calcium Carbonate/ Glycine (Tums) 500 mg PRN AFTMEALHC  PRN PO INDIGESTION Last 

administered on 3/25/17at 15:07;  Start 3/25/17 at 14:45





Active Scripts


Active


Reported


Toprol Xl (Metoprolol Succinate) 50 Mg Tab.er.24h 1 Tab PO HS


Losartan Potassium 50 Mg Tablet 50 Mg PO HS


Norvasc (Amlodipine Besylate) 5 Mg Tablet 1 Tab PO DAILY


Levothyroxine Sodium 125 Mcg Tablet 1 Tab PO DAILY





Allergies


Allergies:  


Coded Allergies:  


     Penicillins (Verified  Allergy, Intermediate, 16)


     ibuprofen (Verified  Allergy, Intermediate, 16)


     banana (Verified  Allergy, Unknown, 3/25/17)


     honey (Verified  Allergy, Unknown, 3/25/17)





ROS


General:  No: Fatigue, Malaise


Respiratory:  YES: Other (she has been somewhat hypoxic since admission. 

Possible chest contusion.)


Musculoskeletal:  Yes Joint Pain (left shoulder pain since the fall)





Physical Exam


General:  Alert, Oriented X3, Cooperative, No acute distress


HEENT:  Other (trace contusion evidence)


Lungs:  Normal air movement, Other (she is on 2 L oxygen, and has been somewhat 

hypoxic 3 admission)


Heart:  Regular rate


Abdomen:  Soft


Extremities:  Other (the left shoulder is pinpoint tender to palpation at the 

distal clavicle. Shoulder alignment is grossly normal. Skin over the fracture 

is intact. Passive range of motion of the shoulder causes slight pain at the 

distal clavicle. Motor strength and sensory function distally are preserved on 

the left upper extremity.)


Skin:  No rashes, No breakdown


Neuro:  Normal speech, Sensation intact


MUSCULOSKELETAL:  Abnormal exam of left (the left hip is tender to palpation. 

She had extreme pain with any attempted motion, as if the fracture is very 

unstable. She is unable to lift the knee off the bed or the leg. Light touch 

sensation and motor function of the foot are intact. There is slight shortening 

of the extremity.)





Vitals


VITALS





 Vital Signs








  Date Time  Temp Pulse Resp B/P Pulse Ox O2 Delivery O2 Flow Rate FiO2


 


3/25/17 16:32 100.2       





 100.2       


 


3/25/17 15:36  67 18 143/59 94 Nasal Cannula 2.0 











Labs


Labs





Laboratory Tests








Test


  3/24/17


22:20 3/24/17


22:22 3/25/17


11:51 3/25/17


16:37


 


Urine Collection Type Unknown    


 


Urine Color Yellow    


 


Urine Clarity Clear    


 


Urine pH 7.5    


 


Urine Specific Gravity 1.015    


 


Urine Protein


  Negativemg/dL


(NEG-TRACE) 


  


  


 


 


Urine Glucose (UA)


  Negativemg/dL


(NEG) 


  


  


 


 


Urine Ketones (Stick)


  Negativemg/dL


(NEG) 


  


  


 


 


Urine Blood Negative (NEG)    


 


Urine Nitrite Negative (NEG)    


 


Urine Bilirubin Negative (NEG)    


 


Urine Urobilinogen Dipstick


  0.2mg/dL (0.2


mg/dL) 


  


  


 


 


Urine Leukocyte Esterase Small (NEG)    


 


Urine RBC 0/HPF (0-2)    


 


Urine WBC 5-10/HPF (0-4)    


 


Urine Squamous Epithelial


Cells Few/LPF 


  


  


  


 


 


Urine Renal Epithelial Cells Occ/LPF    


 


Urine Bacteria


  Few/HPF


(0-FEW) 


  


  


 


 


Urine Mucus Mod/LPF    


 


White Blood Count


  


  7.6x10^3/uL


(4.0-11.0) 


  


 


 


Red Blood Count


  


  4.55x10^6/uL


(3.50-5.40) 


  


 


 


Hemoglobin


  


  13.3g/dL


(12.0-15.5) 


  


 


 


Hematocrit


  


  39.7%


(36.0-47.0) 


  


 


 


Mean Corpuscular Volume  87fL ()   


 


Mean Corpuscular Hemoglobin  29pg (25-35)   


 


Mean Corpuscular Hemoglobin


Concent 


  34g/dL (31-37) 


  


  


 


 


Red Cell Distribution Width


  


  14.7%


(11.5-14.5) 


  


 


 


Platelet Count


  


  185x10^3/uL


(140-400) 


  


 


 


Neutrophils (%) (Auto)  67% (31-73)   


 


Lymphocytes (%) (Auto)  20% (24-48)   


 


Monocytes (%) (Auto)  11% (0-9)   


 


Eosinophils (%) (Auto)  2% (0-3)   


 


Basophils (%) (Auto)  0% (0-3)   


 


Neutrophils # (Auto)


  


  5.1x10^3uL


(1.8-7.7) 


  


 


 


Lymphocytes # (Auto)


  


  1.5x10^3/uL


(1.0-4.8) 


  


 


 


Monocytes # (Auto)


  


  0.8x10^3/uL


(0.0-1.1) 


  


 


 


Eosinophils # (Auto)


  


  0.2x10^3/uL


(0.0-0.7) 


  


 


 


Basophils # (Auto)


  


  0.0x10^3/uL


(0.0-0.2) 


  


 


 


Sodium Level


  


  142mmol/L


(136-145) 


  


 


 


Potassium Level


  


  4.1mmol/L


(3.5-5.1) 


  


 


 


Chloride Level


  


  105mmol/L


() 


  


 


 


Carbon Dioxide Level


  


  27mmol/L


(21-32) 


  


 


 


Anion Gap  10 (6-14)   


 


Blood Urea Nitrogen  25mg/dL (7-20)   


 


Creatinine


  


  1.0mg/dL


(0.6-1.0) 


  


 


 


Estimated GFR


(Cockcroft-Gault) 


  52.4 


  


  


 


 


Glucose Level


  


  122mg/dL


(70-99) 


  


 


 


Calcium Level


  


  8.8mg/dL


(8.5-10.1) 


  


 


 


Troponin I Quantitative


  


  < 0.017ng/mL


(0.000-0.055) 


  


 


 


Glucose (Fingerstick)


  


  


  125mg/dL


(70-99) 157mg/dL


(70-99)








Laboratory Tests








Test


  3/24/17


22:20 3/24/17


22:22 3/25/17


11:51 3/25/17


16:37


 


Urine Collection Type Unknown    


 


Urine Color Yellow    


 


Urine Clarity Clear    


 


Urine pH 7.5    


 


Urine Specific Gravity 1.015    


 


Urine Protein


  Negativemg/dL


(NEG-TRACE) 


  


  


 


 


Urine Glucose (UA)


  Negativemg/dL


(NEG) 


  


  


 


 


Urine Ketones (Stick)


  Negativemg/dL


(NEG) 


  


  


 


 


Urine Blood Negative (NEG)    


 


Urine Nitrite Negative (NEG)    


 


Urine Bilirubin Negative (NEG)    


 


Urine Urobilinogen Dipstick


  0.2mg/dL (0.2


mg/dL) 


  


  


 


 


Urine Leukocyte Esterase Small (NEG)    


 


Urine RBC 0/HPF (0-2)    


 


Urine WBC 5-10/HPF (0-4)    


 


Urine Squamous Epithelial


Cells Few/LPF 


  


  


  


 


 


Urine Renal Epithelial Cells Occ/LPF    


 


Urine Bacteria


  Few/HPF


(0-FEW) 


  


  


 


 


Urine Mucus Mod/LPF    


 


White Blood Count


  


  7.6x10^3/uL


(4.0-11.0) 


  


 


 


Red Blood Count


  


  4.55x10^6/uL


(3.50-5.40) 


  


 


 


Hemoglobin


  


  13.3g/dL


(12.0-15.5) 


  


 


 


Hematocrit


  


  39.7%


(36.0-47.0) 


  


 


 


Mean Corpuscular Volume  87fL ()   


 


Mean Corpuscular Hemoglobin  29pg (25-35)   


 


Mean Corpuscular Hemoglobin


Concent 


  34g/dL (31-37) 


  


  


 


 


Red Cell Distribution Width


  


  14.7%


(11.5-14.5) 


  


 


 


Platelet Count


  


  185x10^3/uL


(140-400) 


  


 


 


Neutrophils (%) (Auto)  67% (31-73)   


 


Lymphocytes (%) (Auto)  20% (24-48)   


 


Monocytes (%) (Auto)  11% (0-9)   


 


Eosinophils (%) (Auto)  2% (0-3)   


 


Basophils (%) (Auto)  0% (0-3)   


 


Neutrophils # (Auto)


  


  5.1x10^3uL


(1.8-7.7) 


  


 


 


Lymphocytes # (Auto)


  


  1.5x10^3/uL


(1.0-4.8) 


  


 


 


Monocytes # (Auto)


  


  0.8x10^3/uL


(0.0-1.1) 


  


 


 


Eosinophils # (Auto)


  


  0.2x10^3/uL


(0.0-0.7) 


  


 


 


Basophils # (Auto)


  


  0.0x10^3/uL


(0.0-0.2) 


  


 


 


Sodium Level


  


  142mmol/L


(136-145) 


  


 


 


Potassium Level


  


  4.1mmol/L


(3.5-5.1) 


  


 


 


Chloride Level


  


  105mmol/L


() 


  


 


 


Carbon Dioxide Level


  


  27mmol/L


(21-32) 


  


 


 


Anion Gap  10 (6-14)   


 


Blood Urea Nitrogen  25mg/dL (7-20)   


 


Creatinine


  


  1.0mg/dL


(0.6-1.0) 


  


 


 


Estimated GFR


(Cockcroft-Gault) 


  52.4 


  


  


 


 


Glucose Level


  


  122mg/dL


(70-99) 


  


 


 


Calcium Level


  


  8.8mg/dL


(8.5-10.1) 


  


 


 


Troponin I Quantitative


  


  < 0.017ng/mL


(0.000-0.055) 


  


 


 


Glucose (Fingerstick)


  


  


  125mg/dL


(70-99) 157mg/dL


(70-99)











Images


Images


I reviewed the reports for the shoulder hip and chest. Images independently 

reviewed. She does have very poor bone quality and radiographic osteopenia. 

There are some mild left shoulder osteoarthritis findings, and there does 

appear to be an acute distal clavicle fracture which is nondisplaced. The left 

hip shows a femoral neck fracture. This is similar to a Garden 1 fracture in 

that it is valgus and impacted, but generally a Garden 1 fracture is described 

as incomplete (at the medial neck), and I would describe this fracture as 

complete, with a visible fracture line at the medial neck. Also the angulation 

here was measured by me several times, generally in a 20-25 range of valgus 

malalignment. With 25 of angulation, AVN is more likely.





Assessment/Plan


Assessment/Plan


Closed left distal clavicle fracture, nondisplaced


Closed left hip femoral neck fracture, with valgus malalignment 20-25 degrees. 

It is impacted but complete.


Osteopenia/osteoporosis.


87-year-old ambulator, otherwise independent.





I talked to her and her family about multiple options here. Percutaneous screw 

fixation was my first thought here, but based on her examination, poor bone 

quality radiographically, associated clavicle fracture, and her age, I'm 

concerned that she would have continued pain from the fracture site and high 

risk of reoperation. There would be inability to achieve good compression 

across the fracture site with such poor bone quality. She would be at higher 

risk for AVN because of the angulation. Even a nondisplaced femoral neck 

fracture has AVN risk of 8-10 percent, and 25 angulation may raise the risk to 

about 50%. The clavicle fracture would make it difficult to use a walker and 

limit any weightbearing if needed for fracture healing. I spoke to her about 

the option of a cemented hemiarthroplasty. This is a larger operation than 

percutaneous screw fixation but has less risk of reoperation. Generally pain 

relief is quite good, and she could ambulate as tolerated with a walker, 

without risk of compromising fracture healing if we only did the percutaneous 

fixation, since the fracture would be completely removed with arthroplasty. 

Another option here would be bed rest until she feels well enough to get up out 

of bed, and functional treatment alone. She is in severe pain and that doesn't 

seem to be a good option. We discussed the risks of nonoperative treatment such 

as bedsores, pneumonia, and blood clots.





There are risks of surgery, including infection, bleeding, blood clots, or 

other potential surgical or anesthetic complications. Percutaneous screw 

fixation would have a relatively high risk of AVN and need for further surgery. 

Cemented bipolar arthroplasty would have a lower risk of reoperation but 

somewhat higher risk initially of perioperative morbidity/mortality due to the 

larger operation. Other risks of bipolar arthroplasty include slight risk of 

dislocation or leg length discrepancy. I recommend a cemented bipolar 

arthroplasty. She and her family agree. We will proceed tomorrow, plan for 8:00 

AM with cemented left hip bipolar arthroplasty for femoral neck fracture. All 

of their questions were answered.





I recommend nonoperative treatment for the clavicle fracture with arm sling 

initially for comfort, and using a walker, as tolerated.








CARMELITA WILLIAMSON MD Mar 25, 2017 17:03

## 2017-03-25 NOTE — RAD
Indication fall, pain.



An AP view of the pelvis was obtained as well as targeted AP and frog leg

views of the left hip.



There is suspect bony demineralization. There is a mildly impacted, traumatic,

fracture of the left humeral neck



IMPRESSION: Fractured left humeral neck

## 2017-03-25 NOTE — RAD
Indication fall, pain.



Single view of the chest was obtained. Comparison is made to an examination

11/8/2005.



There is mild unchanged cardiomegaly. There are probable background changes of

fibrosis. A consolidated pneumonia is not seen. An acute finding in the chest

is not apparent.



IMPRESSION: No acute finding

## 2017-03-26 VITALS — DIASTOLIC BLOOD PRESSURE: 56 MMHG | SYSTOLIC BLOOD PRESSURE: 139 MMHG

## 2017-03-26 VITALS — DIASTOLIC BLOOD PRESSURE: 45 MMHG | SYSTOLIC BLOOD PRESSURE: 109 MMHG

## 2017-03-26 VITALS — SYSTOLIC BLOOD PRESSURE: 156 MMHG | DIASTOLIC BLOOD PRESSURE: 63 MMHG

## 2017-03-26 VITALS — DIASTOLIC BLOOD PRESSURE: 56 MMHG | SYSTOLIC BLOOD PRESSURE: 133 MMHG

## 2017-03-26 VITALS — SYSTOLIC BLOOD PRESSURE: 138 MMHG | DIASTOLIC BLOOD PRESSURE: 47 MMHG

## 2017-03-26 VITALS — SYSTOLIC BLOOD PRESSURE: 114 MMHG | DIASTOLIC BLOOD PRESSURE: 48 MMHG

## 2017-03-26 VITALS — SYSTOLIC BLOOD PRESSURE: 123 MMHG | DIASTOLIC BLOOD PRESSURE: 63 MMHG

## 2017-03-26 VITALS — SYSTOLIC BLOOD PRESSURE: 166 MMHG | DIASTOLIC BLOOD PRESSURE: 48 MMHG

## 2017-03-26 VITALS — SYSTOLIC BLOOD PRESSURE: 116 MMHG | DIASTOLIC BLOOD PRESSURE: 49 MMHG

## 2017-03-26 VITALS — SYSTOLIC BLOOD PRESSURE: 154 MMHG | DIASTOLIC BLOOD PRESSURE: 66 MMHG

## 2017-03-26 VITALS — DIASTOLIC BLOOD PRESSURE: 51 MMHG | SYSTOLIC BLOOD PRESSURE: 156 MMHG

## 2017-03-26 VITALS — SYSTOLIC BLOOD PRESSURE: 150 MMHG | DIASTOLIC BLOOD PRESSURE: 58 MMHG

## 2017-03-26 LAB
BASOPHILS # BLD AUTO: 0 X10^3/UL (ref 0–0.2)
BASOPHILS NFR BLD: 0 % (ref 0–3)
EOSINOPHIL NFR BLD: 4 % (ref 0–3)
ERYTHROCYTE [DISTWIDTH] IN BLOOD BY AUTOMATED COUNT: 14.6 % (ref 11.5–14.5)
HCT VFR BLD CALC: 36.8 % (ref 36–47)
HGB BLD-MCNC: 12 G/DL (ref 12–15.5)
LYMPHOCYTES # BLD: 1.1 X10^3/UL (ref 1–4.8)
LYMPHOCYTES NFR BLD AUTO: 11 % (ref 24–48)
MCH RBC QN AUTO: 29 PG (ref 25–35)
MCHC RBC AUTO-ENTMCNC: 33 G/DL (ref 31–37)
MCV RBC AUTO: 90 FL (ref 79–100)
MONOCYTES NFR BLD: 12 % (ref 0–9)
NEUTROPHILS NFR BLD AUTO: 73 % (ref 31–73)
PLATELET # BLD AUTO: 138 X10^3/UL (ref 140–400)
RBC # BLD AUTO: 4.11 X10^6/UL (ref 3.5–5.4)
WBC # BLD AUTO: 9.3 X10^3/UL (ref 4–11)

## 2017-03-26 PROCEDURE — 0SRS0J9 REPLACEMENT OF LEFT HIP JOINT, FEMORAL SURFACE WITH SYNTHETIC SUBSTITUTE, CEMENTED, OPEN APPROACH: ICD-10-PCS | Performed by: ORTHOPAEDIC SURGERY

## 2017-03-26 RX ADMIN — ACETAMINOPHEN SCH MG: 500 TABLET ORAL at 13:53

## 2017-03-26 RX ADMIN — ONDANSETRON PRN MG: 2 INJECTION, SOLUTION INTRAMUSCULAR; INTRAVENOUS at 13:51

## 2017-03-26 RX ADMIN — PANTOPRAZOLE SODIUM SCH MG: 40 INJECTION, POWDER, FOR SOLUTION INTRAVENOUS at 07:45

## 2017-03-26 RX ADMIN — AMLODIPINE BESYLATE SCH MG: 5 TABLET ORAL at 09:00

## 2017-03-26 RX ADMIN — CLINDAMYCIN PHOSPHATE SCH MLS/HR: 12 INJECTION, SOLUTION INTRAVENOUS at 20:25

## 2017-03-26 RX ADMIN — ONDANSETRON PRN MG: 2 INJECTION, SOLUTION INTRAMUSCULAR; INTRAVENOUS at 20:25

## 2017-03-26 RX ADMIN — INSULIN ASPART SCH UNITS: 100 INJECTION, SOLUTION INTRAVENOUS; SUBCUTANEOUS at 16:56

## 2017-03-26 RX ADMIN — INSULIN ASPART SCH UNITS: 100 INJECTION, SOLUTION INTRAVENOUS; SUBCUTANEOUS at 12:00

## 2017-03-26 RX ADMIN — INSULIN ASPART SCH UNITS: 100 INJECTION, SOLUTION INTRAVENOUS; SUBCUTANEOUS at 07:46

## 2017-03-26 RX ADMIN — SODIUM CHLORIDE SCH MLS/HR: 0.45 INJECTION, SOLUTION INTRAVENOUS at 17:01

## 2017-03-26 RX ADMIN — METOPROLOL SUCCINATE SCH MG: 50 TABLET, FILM COATED, EXTENDED RELEASE ORAL at 20:20

## 2017-03-26 RX ADMIN — CLINDAMYCIN PHOSPHATE SCH MLS/HR: 12 INJECTION, SOLUTION INTRAVENOUS at 15:51

## 2017-03-26 RX ADMIN — LEVOTHYROXINE SODIUM SCH MCG: 125 TABLET ORAL at 05:52

## 2017-03-26 RX ADMIN — SODIUM CHLORIDE SCH MLS/HR: 0.45 INJECTION, SOLUTION INTRAVENOUS at 06:02

## 2017-03-26 RX ADMIN — SODIUM CHLORIDE SCH MLS/HR: 0.45 INJECTION, SOLUTION INTRAVENOUS at 20:26

## 2017-03-26 RX ADMIN — LOSARTAN POTASSIUM SCH MG: 50 TABLET, FILM COATED ORAL at 20:19

## 2017-03-26 RX ADMIN — ACETAMINOPHEN SCH MG: 500 TABLET ORAL at 20:20

## 2017-03-26 NOTE — PDOC4
Operative Note


Operative Note


Date of Procedure:   3/26/2017





 Pre-Op Diagnosis:  left hip femoral neck fracture, closed and displaced. 

Closed left distal clavicle fracture, nondisplaced.





Post-Op Diagnosis:  left hip femoral neck fracture, closed and displaced. 

Closed left distal clavicle fracture, nondisplaced.





Procedure/Anesthesia: left hip bipolar arthroplasty.  Closed treatment of 

clavicular fracture; without manipulation.





Anesthesia Type:  General





Surgeon: Carmelita Lopez MD





Assistant:     Gi Lobato





EBL: 100 mL





Specimens Obtained:  femoral head 





Complications:  None





Implant Company: [employer]





Drains: none





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 

ambulator, who fell, fracturing the left hip and the left clavicle. X-rays show 

a femoral neck fracture with valgus angulation 20-25, and nondisplaced distal 

clavicle fracture..  The patient and I and the patient's family discussed the 

risks, benefits and alternatives of treatment.  One alternative for treatment 

of a hip fracture is bedrest, which I generally do not recommend due to the 

risk of bedsores, pneumonia, and blood clots. Another option for this fracture 

would be percutaneous screw fixation. Her large size, poor bone quality, severe 

hip pain, associated clavicle fracture, and fracture angulation make 

percutaneous screw fixation less desirable in my opinion. I recommended a 

cemented bipolar arthroplasty, and I talked to them about the potential risks 

of this, including bleeding, infection, blood clots, dislocation, leg length 

discrepancy or other potential surgical or anesthetic complications.  I 

recommended closed treatment of the clavicle fracture without manipulation. All 

of their questions were answered about surgery and they desired to proceed.  A 

written consent was obtained.





PROCEDURE IN DETAIL:  The patient was identified in the preoperative holding 

area.  The correct left hip was marked by me.  The patient was taken to the 

operating room where a general anesthetic was used.





Preoperative antibiotics were given intravenously. MRSA screen had been ordered 

but was not yet resulted. 2 g of Ancef were used. The patient was positioned 

laterally on a bean-bag with the bony prominences well padded.  A time-out 

procedure was performed.  All of the operating team wore the personal 

ventilated exhaust scrub suits.  The hip and limb were thoroughly prepped 

sterilely, and then draping was performed, using a sterile Ioban hip drape and 

an impervious stockinette such that the skin was entirely covered.





A posterior approach to the hip was used.  Sharp dissection was used and Bovie 

electrocautery was used for hemostasis. Gelpi retractors were placed.  Bovie 

electrocautery was used and the fascia was exposed.  The fascia was divided 

sharply and then a Charnley retractor was placed.  My assistant internally 

rotated the hip and I divided the short external rotators off the posterior 

aspect of the hip.  The Charnley retractor was placed deeper now to protect the 

sciatic nerve with the short external rotators.  The capsule was divided in an 

inverted T fashion.  The fracture was identified, and was easily displaced and 

unstable. The neck was recut with a saw.  The neck fragment was removed. The 

head was difficult to remove due to poor bone quality. The corkscrew device was 

inserted several times into the femoral head, and simply pulled free from the 

very soft bone, an indication that screw fixation would likely also had 

difficulty with purchase. The head was ultimately removed with the corkscrew 

after several attempts, and measured using templates.  The acetabulum was 

cleared of bony fragments.





The lateral aspect of the cut femoral neck was exposed. A box osteotome was 

used to enlarge the entry, at the lateral cortex of the femoral neck.  A manual 

T-handle canal finder was used first, followed by sequential power reaming 

based on x-rays sizing and intramedullary bone chatter.  Sequential broaching 

was then performed and then the calcar reamer was used to ream the neck on the 

final broach.  Different head and neck lengths were trialed until satisfactory 

length and stability were achieved in full extension, hip flexion of 90 degrees

, and internal rotation. Trial components were removed, and the canal was 

irrigated thoroughly with the Simpulse device, and then dried carefully. 

Epinephrine-saline irrigation was also used to help prevent bone cement 

associated hypertension.





A cement restrictor was placed.  The final implants were opened based on the 

trial sizing.  Two packages of Blanch speed set bone cement were mixed in 

powdered form with 1 g vancomycin, and 1.2 g tobramycin, and then were vacuum-

mixed with the monomer, and placed into a cement gun. The cement was now 

pressurized down the canal against the cement restrictor using a third-

generation cementing technique.  The final stem was now cemented, and after 

excess cement was removed, the stem was held in an anteverted and valgus 

position until the cement hardened. The final head assembly was tamped onto the 

Perera taper.  The hip was reduced a final time with my assistant applying 

longitudinal traction and rotation, while I guided the head into the 

acetabulum. A final check was made of limb length and stability in multiple 

positions. Copious irrigation was used.  A periarticular injection was used.  

The capsule was closed with #2 Ethibond.  The fascia was closed with #2 Vicryl. 

#1 Vicryl was used in the deep subcutaneous fascia due to the patients thick 

adipose tissue. My assistant completed the closure now using 2-0 Vicryl suture 

in the subcutaneous tissues, and staples in the skin.  Xeroform and a bulky 

sterile dressing were applied.  An abduction pillow was used.  An arm sling 

will be used for the clavicle fracture. The patient tolerated the procedure 

well.  Needle and sponge counts were correct.  There were no apparent 

complications.








CARMELITA LOPEZ MD Mar 26, 2017 10:44

## 2017-03-26 NOTE — EKG
[facility]

              [address] [place] [address]

Test Date:    2017               Test Time:    11:09:01

Pat Name:     LAURI SUGGS            Department:   

Patient ID:   [id number]           Room:         506 1

Gender:       F                        Technician:   

:          [id number]               Requested By: SELAM MORAN

Order Number: [id number]            Reading MD:   Haris Smith

                                 Measurements

Intervals                              Axis          

Rate:         95                       P:            48

CO:           186                      QRS:          4

QRSD:         88                       T:            60

QT:           360                                    

QTc:          456                                    

                           Interpretive Statements

SINUS RHYTHM

VENTRICULAR PREMATURE COMPLEX(ES)

ABNORMAL ECG

RI6.01

No previous ECG available for comparison



Electronically Signed On 4-3-2017 16:59:54 CDT by Haris Smith

## 2017-03-26 NOTE — PDOC
PROGRESS NOTES


Chief Complaint


Chief Complaint


1. Left fem neck fx, closed


2. Left distal clavicular fx, closed


3. MEchanical fall


4. HTN, controlled


5. Hypothyroidism on synthroid





History of Present Illness


History of Present Illness


undergoing left hip sx


LAbs reviewed





Plan:


Check post op labs


Post op care


ff up vit D levels


PT/OT


SW for dc planning rehab etc


Will see later post op





Vitals


Vitals





 Vital Signs








  Date Time  Temp Pulse Resp B/P Pulse Ox O2 Delivery O2 Flow Rate FiO2


 


3/26/17 12:20  75 14 140/61 93 Venturi Mask 15 


 


3/26/17 12:05 99.9       





 99.9       











Physical Exam


General:  Alert, Oriented X3, Cooperative, No acute distress


Heart:  Regular rate


Abdomen:  Soft


Extremities:  Other (the left shoulder is pinpoint tender to palpation at the 

distal clavicle. Shoulder alignment is grossly normal. Skin over the fracture 

is intact. Passive range of motion of the shoulder causes slight pain at the 

distal clavicle. Motor strength and sensory function distally are preserved on 

the left upper extremity.)


Skin:  No rashes, No breakdown





Labs


LABS





Laboratory Tests








Test


  3/25/17


16:37 3/25/17


20:48 3/26/17


04:11 3/26/17


07:33


 


Glucose (Fingerstick)


  157mg/dL


(70-99) 131mg/dL


(70-99) 


  111mg/dL


(70-99)


 


White Blood Count


  


  


  9.3x10^3/uL


(4.0-11.0) 


 


 


Red Blood Count


  


  


  4.11x10^6/uL


(3.50-5.40) 


 


 


Hemoglobin


  


  


  12.0g/dL


(12.0-15.5) 


 


 


Hematocrit


  


  


  36.8%


(36.0-47.0) 


 


 


Mean Corpuscular Volume   90fL ()  


 


Mean Corpuscular Hemoglobin   29pg (25-35)  


 


Mean Corpuscular Hemoglobin


Concent 


  


  33g/dL (31-37) 


  


 


 


Red Cell Distribution Width


  


  


  14.6%


(11.5-14.5) 


 


 


Platelet Count


  


  


  138x10^3/uL


(140-400) 


 


 


Neutrophils (%) (Auto)   73% (31-73)  


 


Lymphocytes (%) (Auto)   11% (24-48)  


 


Monocytes (%) (Auto)   12% (0-9)  


 


Eosinophils (%) (Auto)   4% (0-3)  


 


Basophils (%) (Auto)   0% (0-3)  


 


Neutrophils # (Auto)


  


  


  6.7x10^3uL


(1.8-7.7) 


 


 


Lymphocytes # (Auto)


  


  


  1.1x10^3/uL


(1.0-4.8) 


 


 


Monocytes # (Auto)


  


  


  1.1x10^3/uL


(0.0-1.1) 


 


 


Eosinophils # (Auto)


  


  


  0.4x10^3/uL


(0.0-0.7) 


 


 


Basophils # (Auto)


  


  


  0.0x10^3/uL


(0.0-0.2) 


 














Test


  3/26/17


11:57 


  


  


 


 


Glucose (Fingerstick)


  146mg/dL


(70-99) 


  


  


 











Assessment and Plan


Assessmemt and Plan


 Problems


Medical Problems:


(1) Fall


Status: Acute  





(2) Hypoxia


Status: Acute  





(3) Injury of clavicle


Status: Acute  








Problems:  





Comment


Review of Relevant


I have reviewed the following items tino (where applicable) has been applied.


Labs





Laboratory Tests








Test


  3/24/17


22:20 3/24/17


22:22 3/25/17


11:51 3/25/17


16:37


 


Urine Collection Type Unknown    


 


Urine Color Yellow    


 


Urine Clarity Clear    


 


Urine pH 7.5    


 


Urine Specific Gravity 1.015    


 


Urine Protein


  Negativemg/dL


(NEG-TRACE) 


  


  


 


 


Urine Glucose (UA)


  Negativemg/dL


(NEG) 


  


  


 


 


Urine Ketones (Stick)


  Negativemg/dL


(NEG) 


  


  


 


 


Urine Blood Negative (NEG)    


 


Urine Nitrite Negative (NEG)    


 


Urine Bilirubin Negative (NEG)    


 


Urine Urobilinogen Dipstick


  0.2mg/dL (0.2


mg/dL) 


  


  


 


 


Urine Leukocyte Esterase Small (NEG)    


 


Urine RBC 0/HPF (0-2)    


 


Urine WBC 5-10/HPF (0-4)    


 


Urine Squamous Epithelial


Cells Few/LPF 


  


  


  


 


 


Urine Renal Epithelial Cells Occ/LPF    


 


Urine Bacteria


  Few/HPF


(0-FEW) 


  


  


 


 


Urine Mucus Mod/LPF    


 


White Blood Count


  


  7.6x10^3/uL


(4.0-11.0) 


  


 


 


Red Blood Count


  


  4.55x10^6/uL


(3.50-5.40) 


  


 


 


Hemoglobin


  


  13.3g/dL


(12.0-15.5) 


  


 


 


Hematocrit


  


  39.7%


(36.0-47.0) 


  


 


 


Mean Corpuscular Volume  87fL ()   


 


Mean Corpuscular Hemoglobin  29pg (25-35)   


 


Mean Corpuscular Hemoglobin


Concent 


  34g/dL (31-37) 


  


  


 


 


Red Cell Distribution Width


  


  14.7%


(11.5-14.5) 


  


 


 


Platelet Count


  


  185x10^3/uL


(140-400) 


  


 


 


Neutrophils (%) (Auto)  67% (31-73)   


 


Lymphocytes (%) (Auto)  20% (24-48)   


 


Monocytes (%) (Auto)  11% (0-9)   


 


Eosinophils (%) (Auto)  2% (0-3)   


 


Basophils (%) (Auto)  0% (0-3)   


 


Neutrophils # (Auto)


  


  5.1x10^3uL


(1.8-7.7) 


  


 


 


Lymphocytes # (Auto)


  


  1.5x10^3/uL


(1.0-4.8) 


  


 


 


Monocytes # (Auto)


  


  0.8x10^3/uL


(0.0-1.1) 


  


 


 


Eosinophils # (Auto)


  


  0.2x10^3/uL


(0.0-0.7) 


  


 


 


Basophils # (Auto)


  


  0.0x10^3/uL


(0.0-0.2) 


  


 


 


Sodium Level


  


  142mmol/L


(136-145) 


  


 


 


Potassium Level


  


  4.1mmol/L


(3.5-5.1) 


  


 


 


Chloride Level


  


  105mmol/L


() 


  


 


 


Carbon Dioxide Level


  


  27mmol/L


(21-32) 


  


 


 


Anion Gap  10 (6-14)   


 


Blood Urea Nitrogen  25mg/dL (7-20)   


 


Creatinine


  


  1.0mg/dL


(0.6-1.0) 


  


 


 


Estimated GFR


(Cockcroft-Gault) 


  52.4 


  


  


 


 


Glucose Level


  


  122mg/dL


(70-99) 


  


 


 


Calcium Level


  


  8.8mg/dL


(8.5-10.1) 


  


 


 


Troponin I Quantitative


  


  < 0.017ng/mL


(0.000-0.055) 


  


 


 


Glucose (Fingerstick)


  


  


  125mg/dL


(70-99) 157mg/dL


(70-99)














Test


  3/25/17


20:48 3/26/17


04:11 3/26/17


07:33 3/26/17


11:57


 


Glucose (Fingerstick)


  131mg/dL


(70-99) 


  111mg/dL


(70-99) 146mg/dL


(70-99)


 


White Blood Count


  


  9.3x10^3/uL


(4.0-11.0) 


  


 


 


Red Blood Count


  


  4.11x10^6/uL


(3.50-5.40) 


  


 


 


Hemoglobin


  


  12.0g/dL


(12.0-15.5) 


  


 


 


Hematocrit


  


  36.8%


(36.0-47.0) 


  


 


 


Mean Corpuscular Volume  90fL ()   


 


Mean Corpuscular Hemoglobin  29pg (25-35)   


 


Mean Corpuscular Hemoglobin


Concent 


  33g/dL (31-37) 


  


  


 


 


Red Cell Distribution Width


  


  14.6%


(11.5-14.5) 


  


 


 


Platelet Count


  


  138x10^3/uL


(140-400) 


  


 


 


Neutrophils (%) (Auto)  73% (31-73)   


 


Lymphocytes (%) (Auto)  11% (24-48)   


 


Monocytes (%) (Auto)  12% (0-9)   


 


Eosinophils (%) (Auto)  4% (0-3)   


 


Basophils (%) (Auto)  0% (0-3)   


 


Neutrophils # (Auto)


  


  6.7x10^3uL


(1.8-7.7) 


  


 


 


Lymphocytes # (Auto)


  


  1.1x10^3/uL


(1.0-4.8) 


  


 


 


Monocytes # (Auto)


  


  1.1x10^3/uL


(0.0-1.1) 


  


 


 


Eosinophils # (Auto)


  


  0.4x10^3/uL


(0.0-0.7) 


  


 


 


Basophils # (Auto)


  


  0.0x10^3/uL


(0.0-0.2) 


  


 








Laboratory Tests








Test


  3/25/17


16:37 3/25/17


20:48 3/26/17


04:11 3/26/17


07:33


 


Glucose (Fingerstick)


  157mg/dL


(70-99) 131mg/dL


(70-99) 


  111mg/dL


(70-99)


 


White Blood Count


  


  


  9.3x10^3/uL


(4.0-11.0) 


 


 


Red Blood Count


  


  


  4.11x10^6/uL


(3.50-5.40) 


 


 


Hemoglobin


  


  


  12.0g/dL


(12.0-15.5) 


 


 


Hematocrit


  


  


  36.8%


(36.0-47.0) 


 


 


Mean Corpuscular Volume   90fL ()  


 


Mean Corpuscular Hemoglobin   29pg (25-35)  


 


Mean Corpuscular Hemoglobin


Concent 


  


  33g/dL (31-37) 


  


 


 


Red Cell Distribution Width


  


  


  14.6%


(11.5-14.5) 


 


 


Platelet Count


  


  


  138x10^3/uL


(140-400) 


 


 


Neutrophils (%) (Auto)   73% (31-73)  


 


Lymphocytes (%) (Auto)   11% (24-48)  


 


Monocytes (%) (Auto)   12% (0-9)  


 


Eosinophils (%) (Auto)   4% (0-3)  


 


Basophils (%) (Auto)   0% (0-3)  


 


Neutrophils # (Auto)


  


  


  6.7x10^3uL


(1.8-7.7) 


 


 


Lymphocytes # (Auto)


  


  


  1.1x10^3/uL


(1.0-4.8) 


 


 


Monocytes # (Auto)


  


  


  1.1x10^3/uL


(0.0-1.1) 


 


 


Eosinophils # (Auto)


  


  


  0.4x10^3/uL


(0.0-0.7) 


 


 


Basophils # (Auto)


  


  


  0.0x10^3/uL


(0.0-0.2) 


 














Test


  3/26/17


11:57 


  


  


 


 


Glucose (Fingerstick)


  146mg/dL


(70-99) 


  


  


 








Medications





 Current Medications


Acetaminophen (Tylenol) 650 mg 1X  ONCE PO  Last administered on 3/24/17at 22:07

;  Start 3/24/17 at 22:00;  Stop 3/24/17 at 22:01;  Status DC


Metoprolol Tartrate (Lopressor) 50 mg 1X  ONCE PO  Last administered on 3/24/

17at 23:43;  Start 3/24/17 at 23:30;  Stop 3/24/17 at 23:31;  Status DC


Ondansetron HCl (Zofran) 4 mg PRN Q8HRS  PRN IV NAUSEA/VOMITING;  Start 3/24/17 

at 23:30;  Stop 3/25/17 at 09:50;  Status DC


Acetaminophen (Tylenol) 650 mg PRN Q4HRS  PRN PO FEVER Last administered on 3/25

/17at 21:11;  Start 3/24/17 at 23:30;  Stop 3/25/17 at 23:29;  Status DC


Pneumococcal Polyvalent Vaccine (Do NOT chart on this placeholder) 1 each 1X  

ONCE MC ;  Start 3/25/17 at 03:00;  Stop 3/25/17 at 03:01;  Status UNV


Pneumococcal Polyvalent Vaccine (Pneumovax 23) 0.5 ml ONCE ONCE VAX IM ;  Start 

3/25/17 at 09:00;  Stop 3/25/17 at 09:01;  Status DC


Tramadol HCl (Ultram) 50 mg PRN Q6HRS  PRN PO PAIN Last administered on 3/25/

17at 18:09;  Start 3/25/17 at 03:30


Ondansetron HCl (Zofran) 4 mg PRN Q6HRS  PRN IV NAUSEA/VOMITING 1ST CHOICE;  

Start 3/25/17 at 09:48


Insulin Aspart (Novolog) 0-9 UNITS TIDWMEALS SQ ;  Start 3/25/17 at 12:00


Dextrose 12.5 gm PRN Q15MIN  PRN IV SEE COMMENTS;  Start 3/25/17 at 10:00


Amlodipine Besylate (Norvasc) 5 mg DAILY PO  Last administered on 3/25/17at 10:

24;  Start 3/25/17 at 10:30


Levothyroxine Sodium (Synthroid) 125 mcg DAILY07 PO  Last administered on 3/25/

17at 10:25;  Start 3/25/17 at 10:30


Losartan Potassium (Cozaar) 50 mg HS PO  Last administered on 3/25/17at 21:03;  

Start 3/25/17 at 21:00


Metoprolol Succinate 50 mg 50 mg HS PO  Last administered on 3/25/17at 21:04;  

Start 3/25/17 at 21:00


Sodium Chloride (Iv Sodium Chloride 0.45%) 1,000 ml @  100 mls/hr Q10H IV  Last 

administered on 3/26/17at 06:02;  Start 3/25/17 at 10:00


Pantoprazole Sodium (Protonix Vial) 40 mg DAILYAC IVP  Last administered on 3/26

/17at 07:45;  Start 3/26/17 at 07:30


Calcium Carbonate/ Glycine 500 mg 500 mg PRN AFTMEALHC  PRN PO INDIGESTION Last 

administered on 3/25/17at 15:07;  Start 3/25/17 at 14:45


Cefazolin Sodium 50 ml @  100 mls/hr 1X  ONCE IV  Last administered on 3/25/

17at 17:36;  Start 3/25/17 at 17:00;  Stop 3/25/17 at 17:29;  Status DC


Clindamycin Phosphate (Cleocin 600 Mg Premix) 50 ml @  100 mls/hr 1X PREOP  PRN 

IV see comments;  Start 3/25/17 at 17:15


Tobramycin Sulfate 1.2 gm 1.2 gm PRN 1X  PRN TP FOR SURGERY;  Start 3/26/17 at 

06:45;  Stop 3/26/17 at 09:00;  Status DC


Tranexamic Acid 1000 mg/Sodium Chloride 60 ml @ 60 mls/hr 1X PERIOP  ONCE INJ  

Last administered on 3/26/17at 09:24;  Start 3/26/17 at 07:00;  Stop 3/26/17 at 

07:59;  Status DC


Tranexamic Acid/ Sodium Chloride (Cyklokapron/Iv Sodium Chloride 0.9% 50ml) 60 

ml @ 60 mls/hr 1X PERIOP  ONCE INJ  Last administered on 3/26/17at 09:24;  

Start 3/26/17 at 09:00;  Stop 3/26/17 at 09:59;  Status DC


Vancomycin HCl 1 gm STK-MED ONCE .ROUTE ;  Start 3/26/17 at 06:43;  Stop 3/26/

17 at 06:44;  Status DC


Tobramycin Sulfate 1.2 gm STK-MED ONCE .ROUTE ;  Start 3/26/17 at 06:43;  Stop 3

/26/17 at 06:44;  Status DC


Epinephrine HCl 30 mg 30 mg STK-MED ONCE .ROUTE ;  Start 3/26/17 at 06:43;  

Stop 3/26/17 at 06:44;  Status DC


Morphine Sulfate/ Ketorolac Tromethamine/ Ropivacaine/ Epinephrine HCl/ Sodium 

Chloride (Morphine 5mg Syringe/Toradol/ Naropin 0.5%/ Adrenalin/Iv Sodium 

Chloride 0.9% 100ml) 100.5 ml @  100.5 mls/ hr 1X PERIOP  ONCE INT ART  Last 

administered on 3/26/17at 09:24;  Start 3/26/17 at 07:00;  Stop 3/26/17 at 07:59

;  Status DC


Fentanyl Citrate 250 mcg 250 mcg STK-MED ONCE .ROUTE ;  Start 3/26/17 at 07:32;

  Stop 3/26/17 at 07:33;  Status DC


Propofol (Diprivan) 20 ml @ As Directed STK-MED ONCE IV ;  Start 3/26/17 at 07:

32;  Stop 3/26/17 at 07:33;  Status DC


Dexamethasone Sodium Phosphate (Decadron) 20 mg STK-MED ONCE .ROUTE ;  Start 3/

26/17 at 07:32;  Stop 3/26/17 at 07:33;  Status DC


Ondansetron HCl (Zofran) 4 mg STK-MED ONCE .ROUTE ;  Start 3/26/17 at 07:32;  

Stop 3/26/17 at 07:33;  Status DC


Lidocaine HCl 100 mg STK-MED ONCE .ROUTE ;  Start 3/26/17 at 07:32;  Stop 3/26/

17 at 07:33;  Status DC


Ondansetron HCl (Zofran) 4 mg PRN Q6HRS  PRN IV Nausea;  Start 3/26/17 at 08:15

;  Stop 3/27/17 at 08:14


Fentanyl Citrate (Fentanyl 2ml Vial) 25 mcg PRN Q5MIN  PRN IV MILD PAIN;  Start 

3/26/17 at 08:15;  Stop 3/27/17 at 08:14


Fentanyl Citrate (Fentanyl 2ml Vial) 50 mcg PRN Q5MIN  PRN IV MODERATE PAIN;  

Start 3/26/17 at 08:15;  Stop 3/27/17 at 08:14


Morphine Sulfate 1 mg 1 mg PRN Q10MIN  PRN IV SEVERE PAIN;  Start 3/26/17 at 08:

15;  Stop 3/27/17 at 08:14


Lactated Ringer's (Iv Lactated Ringers) 1,000 ml @  0 mls/hr Q0M IV ;  Start 3/

26/17 at 08:05;  Stop 3/26/17 at 20:04


Lidocaine HCl 2 ml 1X PRN  PRN ID IV START;  Start 3/26/17 at 08:15;  Stop 3/27/

17 at 08:14


Hydromorphone HCl (Dilaudid) 0.5 mg PRN Q10MIN  PRN IV SEV PAIN,Second choice;  

Start 3/26/17 at 08:15;  Stop 3/27/17 at 08:14


Prochlorperazine Edisylate (Compazine) 5 mg PACU PRN  PRN IV NAUSEA Last 

administered on 3/26/17at 11:06;  Start 3/26/17 at 08:15;  Stop 3/27/17 at 08:14


Phenylephrine HCl 1 mg STK-MED ONCE IV ;  Start 3/26/17 at 09:30;  Stop 3/26/17 

at 09:31;  Status DC


Sevoflurane 90 ml 90 ml STK-MED ONCE IH ;  Start 3/26/17 at 09:30;  Stop 3/26/

17 at 09:31;  Status DC


Cefazolin Sodium/ Dextrose (Ancef 2gm Premix) 50 ml @  100 mls/hr 1X  ONCE IV  

Last administered on 3/26/17at 09:06;  Start 3/26/17 at 09:45;  Stop 3/26/17 at 

10:14;  Status DC


Vancomycin HCl 1 gm STK-MED ONCE .ROUTE ;  Start 3/26/17 at 10:02;  Stop 3/26/

17 at 10:03;  Status DC


Tobramycin Sulfate 1.2 gm STK-MED ONCE .ROUTE ;  Start 3/26/17 at 10:02;  Stop 3

/26/17 at 10:03;  Status DC


Morphine Sulfate (Morphine Preservative Free) 5 mg STK-MED ONCE .ROUTE ;  Start 

3/26/17 at 10:30;  Stop 3/26/17 at 10:31;  Status DC


Oxycodone HCl (Roxicodone) 5 mg PRN Q3HRS  PRN PO PAIN;  Start 3/26/17 at 10:45


Morphine Sulfate 2 mg PRN Q1HR  PRN IV PAIN;  Start 3/26/17 at 10:45


Fentanyl Citrate (Fentanyl 2ml Vial) 25 mcg PRN Q1HR  PRN IV PAIN;  Start 3/26/

17 at 10:45


Acetaminophen (Tylenol) 500 mg TID PO ;  Start 3/26/17 at 14:00


Multivitamins (Thera M Plus) 1 tab DAILY PO ;  Start 3/27/17 at 09:00


Senna/Docusate Sodium (Senna Plus) 1 tab DAILY PO ;  Start 3/27/17 at 09:00


Polyethylene Glycol (miraLAX PACKET) 17 gm PRN DAILY  PRN PO CONSTIPATION;  

Start 3/26/17 at 10:45


Vitamin D 1000 unit 1,000 unit DAILY PO ;  Start 3/27/17 at 09:00


Sodium Chloride (Iv Sodium Chloride 0.45%) 1,000 ml @  75 mls/hr [id number] IV ;  

Start 3/26/17 at 10:44


Ondansetron HCl (Zofran) 4 mg PRN Q4HRS  PRN IV NAUSEA/VOMITING;  Start 3/26/17 

at 10:45


Aspirin (Kyle Aspirin) 325 mg BID PO ;  Start 3/26/17 at 21:00;  Status UNV


Magnesium Hydroxide (Milk Of Magnesia) 2,400 mg 1X PRN  PRN PO CONSTIPATION;  

Start 3/27/17 at 06:00;  Stop 3/28/17 at 05:59


Bisacodyl (Dulcolax Supp) 10 mg 1X PRN  PRN [place] CONSTIPATION;  Start 3/27/17 at 

16:00;  Stop 3/28/17 at 15:59


Acetaminophen/ Hydrocodone Bitart (Lortab 7.5/325) 1 tab PRN Q4HRS  PRN PO PAIN

;  Start 3/26/17 at 10:45


Morphine Sulfate 4 mg PRN Q2HR  PRN IV PAIN;  Start 3/26/17 at 10:45


Acetaminophen/ Hydrocodone Bitart (Lortab 7.5/325) 2 tab PRN Q4HRS  PRN PO PAIN

;  Start 3/26/17 at 10:45


Dextrose 12.5 gm 12.5 gm PRN Q15MIN  PRN IV SEE COMMENTS;  Start 3/26/17 at 10:

45


Cefazolin Sodium/ Dextrose 50 ml @  100 mls/hr Q6H IV ;  Start 3/26/17 at 11:00

;  Stop 3/26/17 at 23:29;  Status UNV


Clindamycin Phosphate (Cleocin 600 Mg Premix) 50 ml @  100 mls/hr Q6H IV ;  

Start 3/26/17 at 15:00;  Stop 3/27/17 at 03:29


Albuterol Sulfate (Ventolin Neb Soln) 2.5 mg 1X  ONCE NEB  Last administered on 

3/26/17at 12:01;  Start 3/26/17 at 11:45;  Stop 3/26/17 at 11:56;  Status DC





Active Scripts


Active


Reported


Toprol Xl (Metoprolol Succinate) 50 Mg Tab.er.24h 1 Tab PO HS


Losartan Potassium 50 Mg Tablet 50 Mg PO HS


Norvasc (Amlodipine Besylate) 5 Mg Tablet 1 Tab PO DAILY


Levothyroxine Sodium 125 Mcg Tablet 1 Tab PO DAILY


Vitals/I & O





 Vital Sign - Last 24 Hours








 3/25/17 3/25/17 3/25/17 3/25/17





 15:36 16:32 18:09 19:00


 


Temp 99.5 100.2  96.4





 99.5 100.2  96.4


 


Pulse 67   69


 


Resp 18  20 18


 


B/P 143/59   130/52


 


Pulse Ox 94  94 92


 


O2 Delivery Nasal Cannula  Nasal Cannula Nasal Cannula


 


O2 Flow Rate 2.0  2.0 2.0


 


    





    





 3/25/17 3/25/17 3/25/17 3/25/17





 19:09 20:04 21:03 21:04


 


Pulse   69 69


 


B/P   144/60 144/60


 


Pulse Ox 94   


 


O2 Delivery Nasal Cannula Nasal Cannula  


 


O2 Flow Rate 2.0 2.0  





 3/25/17 3/26/17 3/26/17 3/26/17





 23:00 03:00 07:00 08:00


 


Temp 98.4 97.9 97.7 





 98.4 97.9 97.7 


 


Pulse 66 70 75 


 


Resp 18 18 18 


 


B/P 141/59 154/66 156/63 


 


Pulse Ox 92 92 90 


 


O2 Delivery Nasal Cannula Nasal Cannula Nasal Cannula Nasal Cannula


 


O2 Flow Rate 2.0 2.0 2.0 2.0


 


    





    





 3/26/17 3/26/17 3/26/17 3/26/17





 10:50 11:05 11:20 11:35


 


Temp 100.7   99.9





 100.7   99.9


 


Pulse 91 98 82 78


 


Resp 16 16 14 16


 


B/P 142/61 145/61 145/61 144/56


 


Pulse Ox 97 92 94 93


 


O2 Delivery Simple Mask Nasal Cannula Venturi Mask Venturi Mask


 


O2 Flow Rate 10 6 15 15


 


    





    





 3/26/17 3/26/17 3/26/17 





 11:50 12:05 12:20 


 


Temp  99.9  





  99.9  


 


Pulse 74 74 75 


 


Resp 14 14 14 


 


B/P 138/62 135/59 140/61 


 


Pulse Ox 94 95 93 


 


O2 Delivery Venturi Mask Venturi Mask Venturi Mask 


 


O2 Flow Rate 15 15 15 














 Intake and Output   


 


 3/25/17 3/25/17 3/26/17





 15:00 23:00 07:00


 


Intake Total 180 ml  360 ml


 


Output Total   575 ml


 


Balance 180 ml  -215 ml














KATT FRAGA MD Mar 26, 2017 13:20

## 2017-03-26 NOTE — RAD
Indication postop.



AP and lateral views of the left hip were obtained.



 A left hip prosthesis is now seen. Postoperative changes are seen in the soft

tissues. No unexpected finding is seen.



IMPRESSION:: Hip replacement. No complication seen

## 2017-03-27 VITALS — DIASTOLIC BLOOD PRESSURE: 54 MMHG | SYSTOLIC BLOOD PRESSURE: 133 MMHG

## 2017-03-27 VITALS — DIASTOLIC BLOOD PRESSURE: 54 MMHG | SYSTOLIC BLOOD PRESSURE: 118 MMHG

## 2017-03-27 VITALS — SYSTOLIC BLOOD PRESSURE: 154 MMHG | DIASTOLIC BLOOD PRESSURE: 65 MMHG

## 2017-03-27 VITALS — DIASTOLIC BLOOD PRESSURE: 53 MMHG | SYSTOLIC BLOOD PRESSURE: 124 MMHG

## 2017-03-27 VITALS — SYSTOLIC BLOOD PRESSURE: 120 MMHG | DIASTOLIC BLOOD PRESSURE: 49 MMHG

## 2017-03-27 VITALS — SYSTOLIC BLOOD PRESSURE: 121 MMHG | DIASTOLIC BLOOD PRESSURE: 49 MMHG

## 2017-03-27 LAB
HCT VFR BLD CALC: 32.5 % (ref 36–47)
HGB BLD-MCNC: 10.8 G/DL (ref 12–15.5)
MCHC RBC AUTO-ENTMCNC: 33 G/DL (ref 31–37)

## 2017-03-27 RX ADMIN — TRAMADOL HYDROCHLORIDE PRN MG: 50 TABLET, COATED ORAL at 21:05

## 2017-03-27 RX ADMIN — ENOXAPARIN SODIUM SCH MG: 40 INJECTION SUBCUTANEOUS at 09:03

## 2017-03-27 RX ADMIN — MULTIPLE VITAMINS W/ MINERALS TAB SCH TAB: TAB at 09:03

## 2017-03-27 RX ADMIN — INSULIN ASPART SCH UNITS: 100 INJECTION, SOLUTION INTRAVENOUS; SUBCUTANEOUS at 17:00

## 2017-03-27 RX ADMIN — AMLODIPINE BESYLATE SCH MG: 5 TABLET ORAL at 09:04

## 2017-03-27 RX ADMIN — SENNOSIDES AND DOCUSATE SODIUM SCH TAB: 8.6; 5 TABLET ORAL at 09:00

## 2017-03-27 RX ADMIN — PANTOPRAZOLE SODIUM SCH MG: 40 INJECTION, POWDER, FOR SOLUTION INTRAVENOUS at 06:42

## 2017-03-27 RX ADMIN — CLINDAMYCIN PHOSPHATE SCH MLS/HR: 12 INJECTION, SOLUTION INTRAVENOUS at 03:17

## 2017-03-27 RX ADMIN — POLYETHYLENE GLYCOL 3350 PRN GM: 17 POWDER, FOR SOLUTION ORAL at 09:08

## 2017-03-27 RX ADMIN — ACETAMINOPHEN SCH MG: 500 TABLET ORAL at 09:00

## 2017-03-27 RX ADMIN — PANTOPRAZOLE SODIUM SCH MG: 40 INJECTION, POWDER, FOR SOLUTION INTRAVENOUS at 09:05

## 2017-03-27 RX ADMIN — ACETAMINOPHEN SCH MG: 500 TABLET ORAL at 21:01

## 2017-03-27 RX ADMIN — INSULIN ASPART SCH UNITS: 100 INJECTION, SOLUTION INTRAVENOUS; SUBCUTANEOUS at 08:00

## 2017-03-27 RX ADMIN — LOSARTAN POTASSIUM SCH MG: 50 TABLET, FILM COATED ORAL at 21:02

## 2017-03-27 RX ADMIN — TRAMADOL HYDROCHLORIDE PRN MG: 50 TABLET, COATED ORAL at 14:06

## 2017-03-27 RX ADMIN — SODIUM CHLORIDE SCH MLS/HR: 0.45 INJECTION, SOLUTION INTRAVENOUS at 13:08

## 2017-03-27 RX ADMIN — TRAMADOL HYDROCHLORIDE PRN MG: 50 TABLET, COATED ORAL at 07:03

## 2017-03-27 RX ADMIN — ACETAMINOPHEN SCH MG: 500 TABLET ORAL at 12:01

## 2017-03-27 RX ADMIN — METOPROLOL SUCCINATE SCH MG: 50 TABLET, FILM COATED, EXTENDED RELEASE ORAL at 21:03

## 2017-03-27 RX ADMIN — INSULIN ASPART SCH UNITS: 100 INJECTION, SOLUTION INTRAVENOUS; SUBCUTANEOUS at 12:00

## 2017-03-27 RX ADMIN — LEVOTHYROXINE SODIUM SCH MCG: 125 TABLET ORAL at 06:42

## 2017-03-27 RX ADMIN — VITAMIN D, TAB 1000IU (100/BT) SCH UNIT: 25 TAB at 09:04

## 2017-03-27 NOTE — PDOC
PROGRESS NOTES


Subjective


Subjective


Doing well.





Objective


Vital Signs





 Vital Signs








  Date Time  Temp Pulse Resp B/P Pulse Ox O2 Delivery O2 Flow Rate FiO2


 


3/27/17 12:11     90 Nasal Cannula 4.0 


 


3/27/17 11:32   18     


 


3/27/17 11:00 99.3 79  124/53    





 99.3       








Physical Exam


Sitting up in chair, eating lunch. Postop dressing dry and intact. Calf soft 

and nontender, with a negative Cecily's sign. Good dorsiflexion and 

plantarflexion with no evidence of neurovascular injury. NVI.


Labs





Laboratory Tests








Test


  3/25/17


16:37 3/25/17


17:45 3/25/17


20:48 3/26/17


04:11


 


Glucose (Fingerstick)


  157mg/dL


(70-99) 


  131mg/dL


(70-99) 


 


 


Nasal Screen MRSA (PCR)


  


  Negative


(Negative) 


  


 


 


White Blood Count


  


  


  


  9.3x10^3/uL


(4.0-11.0)


 


Red Blood Count


  


  


  


  4.11x10^6/uL


(3.50-5.40)


 


Hemoglobin


  


  


  


  12.0g/dL


(12.0-15.5)


 


Hematocrit


  


  


  


  36.8%


(36.0-47.0)


 


Mean Corpuscular Volume    90fL () 


 


Mean Corpuscular Hemoglobin    29pg (25-35) 


 


Mean Corpuscular Hemoglobin


Concent 


  


  


  33g/dL (31-37) 


 


 


Red Cell Distribution Width


  


  


  


  14.6%


(11.5-14.5)


 


Platelet Count


  


  


  


  138x10^3/uL


(140-400)


 


Neutrophils (%) (Auto)    73% (31-73) 


 


Lymphocytes (%) (Auto)    11% (24-48) 


 


Monocytes (%) (Auto)    12% (0-9) 


 


Eosinophils (%) (Auto)    4% (0-3) 


 


Basophils (%) (Auto)    0% (0-3) 


 


Neutrophils # (Auto)


  


  


  


  6.7x10^3uL


(1.8-7.7)


 


Lymphocytes # (Auto)


  


  


  


  1.1x10^3/uL


(1.0-4.8)


 


Monocytes # (Auto)


  


  


  


  1.1x10^3/uL


(0.0-1.1)


 


Eosinophils # (Auto)


  


  


  


  0.4x10^3/uL


(0.0-0.7)


 


Basophils # (Auto)


  


  


  


  0.0x10^3/uL


(0.0-0.2)














Test


  3/26/17


07:33 3/26/17


11:57 3/27/17


03:35 


 


 


Glucose (Fingerstick)


  111mg/dL


(70-99) 146mg/dL


(70-99) 


  


 


 


Hemoglobin


  


  


  10.8g/dL


(12.0-15.5) 


 


 


Hematocrit


  


  


  32.5%


(36.0-47.0) 


 


 


Mean Corpuscular Hemoglobin


Concent 


  


  33g/dL (31-37) 


  


 








Laboratory Tests








Test


  3/27/17


03:35


 


Hemoglobin


  10.8g/dL


(12.0-15.5)


 


Hematocrit


  32.5%


(36.0-47.0)


 


Mean Corpuscular Hemoglobin


Concent 33g/dL (31-37) 


 








Imaging


Postop PACU films were reviewed. There is a right hip bipolar hemiarthroplasty 

in satisfactory position, without any apparent complications. Staples in the 

skin.





Assessment


Assessment


POD #1 left hip bipolar


Left closed clavicle fracture.


Problems:  





Plan


Plan of Care


Continue arm sling. WBAT with walker. Continue PT/OT. Continue Lovenox for DVT 

ppx.





Dr. Lopez saw and examined the patient as well.








NORBERTO MARRERO Mar 27, 2017 13:15

## 2017-03-27 NOTE — PDOC
PROGRESS NOTES


Chief Complaint


Chief Complaint


A/P


 left hip femoral neck fracture, closed and displaced. Closed left distal 

clavicle fracture, nondisplaced


HTN


HYPOTHYROIDISM 





Plan 


incentive spirometry 


Pain control 


Avoid co2 narcosis 


Limit IV narcotics, 


oral Norco


DVT prophylaxis 


labs reviewed,





History of Present Illness


History of Present Illness


pain controlled


no fever


no chills





Vitals


Vitals





 Vital Signs








  Date Time  Temp Pulse Resp B/P Pulse Ox O2 Delivery O2 Flow Rate FiO2


 


3/27/17 12:11     90 Nasal Cannula 4.0 


 


3/27/17 11:32   18     


 


3/27/17 11:00 99.3 79  124/53    





 99.3       











Physical Exam


General:  Alert, Oriented X3, Cooperative, No acute distress


Heart:  Regular rate, Normal S1, Normal S2


Lungs:  Clear


Abdomen:  Soft


Extremities:  Other


Skin:  No rashes, No breakdown





Labs


LABS





Laboratory Tests








Test


  3/27/17


03:35


 


Hemoglobin


  10.8g/dL


(12.0-15.5)


 


Hematocrit


  32.5%


(36.0-47.0)


 


Mean Corpuscular Hemoglobin


Concent 33g/dL (31-37) 


 











Assessment and Plan


Assessmemt and Plan


 Problems


Medical Problems:


(1) Fall


Status: Acute  





(2) Hypoxia


Status: Acute  





(3) Injury of clavicle


Status: Acute  








Problems:  





Comment


Review of Relevant


I have reviewed the following items tino (where applicable) has been applied.


Labs





Laboratory Tests








Test


  3/25/17


16:37 3/25/17


17:45 3/25/17


20:48 3/26/17


04:11


 


Glucose (Fingerstick)


  157mg/dL


(70-99) 


  131mg/dL


(70-99) 


 


 


Nasal Screen MRSA (PCR)


  


  Negative


(Negative) 


  


 


 


White Blood Count


  


  


  


  9.3x10^3/uL


(4.0-11.0)


 


Red Blood Count


  


  


  


  4.11x10^6/uL


(3.50-5.40)


 


Hemoglobin


  


  


  


  12.0g/dL


(12.0-15.5)


 


Hematocrit


  


  


  


  36.8%


(36.0-47.0)


 


Mean Corpuscular Volume    90fL () 


 


Mean Corpuscular Hemoglobin    29pg (25-35) 


 


Mean Corpuscular Hemoglobin


Concent 


  


  


  33g/dL (31-37) 


 


 


Red Cell Distribution Width


  


  


  


  14.6%


(11.5-14.5)


 


Platelet Count


  


  


  


  138x10^3/uL


(140-400)


 


Neutrophils (%) (Auto)    73% (31-73) 


 


Lymphocytes (%) (Auto)    11% (24-48) 


 


Monocytes (%) (Auto)    12% (0-9) 


 


Eosinophils (%) (Auto)    4% (0-3) 


 


Basophils (%) (Auto)    0% (0-3) 


 


Neutrophils # (Auto)


  


  


  


  6.7x10^3uL


(1.8-7.7)


 


Lymphocytes # (Auto)


  


  


  


  1.1x10^3/uL


(1.0-4.8)


 


Monocytes # (Auto)


  


  


  


  1.1x10^3/uL


(0.0-1.1)


 


Eosinophils # (Auto)


  


  


  


  0.4x10^3/uL


(0.0-0.7)


 


Basophils # (Auto)


  


  


  


  0.0x10^3/uL


(0.0-0.2)














Test


  3/26/17


07:33 3/26/17


11:57 3/27/17


03:35 


 


 


Glucose (Fingerstick)


  111mg/dL


(70-99) 146mg/dL


(70-99) 


  


 


 


Hemoglobin


  


  


  10.8g/dL


(12.0-15.5) 


 


 


Hematocrit


  


  


  32.5%


(36.0-47.0) 


 


 


Mean Corpuscular Hemoglobin


Concent 


  


  33g/dL (31-37) 


  


 








Laboratory Tests








Test


  3/27/17


03:35


 


Hemoglobin


  10.8g/dL


(12.0-15.5)


 


Hematocrit


  32.5%


(36.0-47.0)


 


Mean Corpuscular Hemoglobin


Concent 33g/dL (31-37) 


 








Microbiology


3/24/17 Urine Culture - Final, Complete


          


3/24/17 Urine Culture Result 1 ([facility]) - Final, Complete


          


3/24/17 Urine Culture Result 2 ([facility]) - Final, Complete


Medications





 Current Medications


Acetaminophen (Tylenol) 650 mg 1X  ONCE PO  Last administered on 3/24/17at 22:07

;  Start 3/24/17 at 22:00;  Stop 3/24/17 at 22:01;  Status DC


Metoprolol Tartrate (Lopressor) 50 mg 1X  ONCE PO  Last administered on 3/24/

17at 23:43;  Start 3/24/17 at 23:30;  Stop 3/24/17 at 23:31;  Status DC


Ondansetron HCl (Zofran) 4 mg PRN Q8HRS  PRN IV NAUSEA/VOMITING;  Start 3/24/17 

at 23:30;  Stop 3/25/17 at 09:50;  Status DC


Acetaminophen (Tylenol) 650 mg PRN Q4HRS  PRN PO FEVER Last administered on 3/25

/17at 21:11;  Start 3/24/17 at 23:30;  Stop 3/25/17 at 23:29;  Status DC


Pneumococcal Polyvalent Vaccine (Do NOT chart on this placeholder) 1 each 1X  

ONCE MC ;  Start 3/25/17 at 03:00;  Stop 3/25/17 at 03:01;  Status UNV


Pneumococcal Polyvalent Vaccine (Pneumovax 23) 0.5 ml ONCE ONCE VAX IM ;  Start 

3/25/17 at 09:00;  Stop 3/25/17 at 09:01;  Status DC


Tramadol HCl (Ultram) 50 mg PRN Q6HRS  PRN PO PAIN Last administered on 3/27/

17at 07:03;  Start 3/25/17 at 03:30


Ondansetron HCl (Zofran) 4 mg PRN Q6HRS  PRN IV NAUSEA/VOMITING 1ST CHOICE Last 

administered on 3/26/17at 20:25;  Start 3/25/17 at 09:48


Insulin Aspart (Novolog) 0-9 UNITS TIDWMEALS SQ ;  Start 3/25/17 at 12:00


Dextrose 12.5 gm PRN Q15MIN  PRN IV SEE COMMENTS;  Start 3/25/17 at 10:00


Amlodipine Besylate (Norvasc) 5 mg DAILY PO  Last administered on 3/27/17at 09:

04;  Start 3/25/17 at 10:30


Levothyroxine Sodium (Synthroid) 125 mcg DAILY07 PO  Last administered on 3/27/

17at 06:42;  Start 3/25/17 at 10:30


Losartan Potassium (Cozaar) 50 mg HS PO  Last administered on 3/25/17at 21:03;  

Start 3/25/17 at 21:00


Metoprolol Succinate 50 mg 50 mg HS PO  Last administered on 3/25/17at 21:04;  

Start 3/25/17 at 21:00


Sodium Chloride (Iv Sodium Chloride 0.45%) 1,000 ml @  100 mls/hr Q10H IV  Last 

administered on 3/26/17at 20:26;  Start 3/25/17 at 10:00


Pantoprazole Sodium (Protonix Vial) 40 mg DAILYAC IVP  Last administered on 3/27

/17at 09:05;  Start 3/26/17 at 07:30


Calcium Carbonate/ Glycine 500 mg 500 mg PRN AFTMEALHC  PRN PO INDIGESTION Last 

administered on 3/25/17at 15:07;  Start 3/25/17 at 14:45


Cefazolin Sodium 50 ml @  100 mls/hr 1X  ONCE IV  Last administered on 3/25/

17at 17:36;  Start 3/25/17 at 17:00;  Stop 3/25/17 at 17:29;  Status DC


Clindamycin Phosphate (Cleocin 600 Mg Premix) 50 ml @  100 mls/hr 1X PREOP  PRN 

IV see comments;  Start 3/25/17 at 17:15


Tobramycin Sulfate 1.2 gm 1.2 gm PRN 1X  PRN TP FOR SURGERY;  Start 3/26/17 at 

06:45;  Stop 3/26/17 at 09:00;  Status DC


Tranexamic Acid 1000 mg/Sodium Chloride 60 ml @ 60 mls/hr 1X PERIOP  ONCE INJ  

Last administered on 3/26/17at 09:24;  Start 3/26/17 at 07:00;  Stop 3/26/17 at 

07:59;  Status DC


Tranexamic Acid/ Sodium Chloride (Cyklokapron/Iv Sodium Chloride 0.9% 50ml) 60 

ml @ 60 mls/hr 1X PERIOP  ONCE INJ  Last administered on 3/26/17at 09:24;  

Start 3/26/17 at 09:00;  Stop 3/26/17 at 09:59;  Status DC


Vancomycin HCl 1 gm STK-MED ONCE .ROUTE ;  Start 3/26/17 at 06:43;  Stop 3/26/

17 at 06:44;  Status DC


Tobramycin Sulfate 1.2 gm STK-MED ONCE .ROUTE ;  Start 3/26/17 at 06:43;  Stop 3

/26/17 at 06:44;  Status DC


Epinephrine HCl 30 mg 30 mg STK-MED ONCE .ROUTE ;  Start 3/26/17 at 06:43;  

Stop 3/26/17 at 06:44;  Status DC


Morphine Sulfate/ Ketorolac Tromethamine/ Ropivacaine/ Epinephrine HCl/ Sodium 

Chloride (Morphine 5mg Syringe/Toradol/ Naropin 0.5%/ Adrenalin/Iv Sodium 

Chloride 0.9% 100ml) 100.5 ml @  100.5 mls/ hr 1X PERIOP  ONCE INT ART  Last 

administered on 3/26/17at 09:24;  Start 3/26/17 at 07:00;  Stop 3/26/17 at 07:59

;  Status DC


Fentanyl Citrate 250 mcg 250 mcg STK-MED ONCE .ROUTE ;  Start 3/26/17 at 07:32;

  Stop 3/26/17 at 07:33;  Status DC


Propofol (Diprivan) 20 ml @ As Directed STK-MED ONCE IV ;  Start 3/26/17 at 07:

32;  Stop 3/26/17 at 07:33;  Status DC


Dexamethasone Sodium Phosphate (Decadron) 20 mg STK-MED ONCE .ROUTE ;  Start 3/

26/17 at 07:32;  Stop 3/26/17 at 07:33;  Status DC


Ondansetron HCl (Zofran) 4 mg STK-MED ONCE .ROUTE ;  Start 3/26/17 at 07:32;  

Stop 3/26/17 at 07:33;  Status DC


Lidocaine HCl 100 mg STK-MED ONCE .ROUTE ;  Start 3/26/17 at 07:32;  Stop 3/26/

17 at 07:33;  Status DC


Ondansetron HCl (Zofran) 4 mg PRN Q6HRS  PRN IV Nausea;  Start 3/26/17 at 08:15

;  Stop 3/27/17 at 08:14;  Status DC


Fentanyl Citrate (Fentanyl 2ml Vial) 25 mcg PRN Q5MIN  PRN IV MILD PAIN;  Start 

3/26/17 at 08:15;  Stop 3/27/17 at 08:14;  Status DC


Fentanyl Citrate (Fentanyl 2ml Vial) 50 mcg PRN Q5MIN  PRN IV MODERATE PAIN;  

Start 3/26/17 at 08:15;  Stop 3/27/17 at 08:14;  Status DC


Morphine Sulfate 1 mg 1 mg PRN Q10MIN  PRN IV SEVERE PAIN;  Start 3/26/17 at 08:

15;  Stop 3/27/17 at 08:14;  Status DC


Lactated Ringer's (Iv Lactated Ringers) 1,000 ml @  0 mls/hr Q0M IV ;  Start 3/

26/17 at 08:05;  Stop 3/26/17 at 20:04;  Status DC


Lidocaine HCl 2 ml 1X PRN  PRN ID IV START;  Start 3/26/17 at 08:15;  Stop 3/27/

17 at 08:14;  Status DC


Hydromorphone HCl (Dilaudid) 0.5 mg PRN Q10MIN  PRN IV SEV PAIN,Second choice;  

Start 3/26/17 at 08:15;  Stop 3/27/17 at 08:14;  Status DC


Prochlorperazine Edisylate (Compazine) 5 mg PACU PRN  PRN IV NAUSEA Last 

administered on 3/26/17at 11:06;  Start 3/26/17 at 08:15;  Stop 3/27/17 at 08:14

;  Status DC


Phenylephrine HCl 1 mg STK-MED ONCE IV ;  Start 3/26/17 at 09:30;  Stop 3/26/17 

at 09:31;  Status DC


Sevoflurane 90 ml 90 ml STK-MED ONCE IH ;  Start 3/26/17 at 09:30;  Stop 3/26/

17 at 09:31;  Status DC


Cefazolin Sodium/ Dextrose (Ancef 2gm Premix) 50 ml @  100 mls/hr 1X  ONCE IV  

Last administered on 3/26/17at 09:06;  Start 3/26/17 at 09:45;  Stop 3/26/17 at 

10:14;  Status DC


Vancomycin HCl 1 gm STK-MED ONCE .ROUTE ;  Start 3/26/17 at 10:02;  Stop 3/26/

17 at 10:03;  Status DC


Tobramycin Sulfate 1.2 gm STK-MED ONCE .ROUTE ;  Start 3/26/17 at 10:02;  Stop 3

/26/17 at 10:03;  Status DC


Morphine Sulfate (Morphine Preservative Free) 5 mg STK-MED ONCE .ROUTE ;  Start 

3/26/17 at 10:30;  Stop 3/26/17 at 10:31;  Status DC


Oxycodone HCl (Roxicodone) 5 mg PRN Q3HRS  PRN PO PAIN;  Start 3/26/17 at 10:45


Morphine Sulfate 2 mg PRN Q1HR  PRN IV PAIN;  Start 3/26/17 at 10:45


Fentanyl Citrate (Fentanyl 2ml Vial) 25 mcg PRN Q1HR  PRN IV PAIN Last 

administered on 3/27/17at 11:02;  Start 3/26/17 at 10:45


Acetaminophen (Tylenol) 500 mg TID PO  Last administered on 3/27/17at 12:01;  

Start 3/26/17 at 14:00


Multivitamins (Thera M Plus) 1 tab DAILY PO  Last administered on 3/27/17at 09:

03;  Start 3/27/17 at 09:00


Senna/Docusate Sodium (Senna Plus) 1 tab DAILY PO ;  Start 3/27/17 at 09:00


Polyethylene Glycol (miraLAX PACKET) 17 gm PRN DAILY  PRN PO CONSTIPATION Last 

administered on 3/27/17at 09:08;  Start 3/26/17 at 10:45


Vitamin D 1000 unit 1,000 unit DAILY PO  Last administered on 3/27/17at 09:04;  

Start 3/27/17 at 09:00


Sodium Chloride (Iv Sodium Chloride 0.45%) 1,000 ml @  75 mls/hr [id number] IV ;  

Start 3/26/17 at 10:44;  Stop 3/26/17 at 20:39;  Status DC


Ondansetron HCl (Zofran) 4 mg PRN Q4HRS  PRN IV NAUSEA/VOMITING;  Start 3/26/17 

at 10:45


Aspirin (Ykle Aspirin) 325 mg BID PO ;  Start 3/26/17 at 21:00;  Status UNV


Magnesium Hydroxide (Milk Of Magnesia) 2,400 mg 1X PRN  PRN PO CONSTIPATION;  

Start 3/27/17 at 06:00;  Stop 3/28/17 at 05:59


Bisacodyl (Dulcolax Supp) 10 mg 1X PRN  PRN GA CONSTIPATION;  Start 3/27/17 at 

16:00;  Stop 3/28/17 at 15:59


Acetaminophen/ Hydrocodone Bitart (Lortab 7.5/325) 1 tab PRN Q4HRS  PRN PO PAIN

;  Start 3/26/17 at 10:45


Morphine Sulfate 4 mg PRN Q2HR  PRN IV PAIN;  Start 3/26/17 at 10:45


Acetaminophen/ Hydrocodone Bitart (Lortab 7.5/325) 2 tab PRN Q4HRS  PRN PO PAIN

;  Start 3/26/17 at 10:45


Dextrose 12.5 gm 12.5 gm PRN Q15MIN  PRN IV SEE COMMENTS;  Start 3/26/17 at 10:

45


Cefazolin Sodium/ Dextrose 50 ml @  100 mls/hr Q6H IV ;  Start 3/26/17 at 11:00

;  Stop 3/26/17 at 23:29;  Status UNV


Clindamycin Phosphate (Cleocin 600 Mg Premix) 50 ml @  100 mls/hr Q6H IV  Last 

administered on 3/27/17at 03:17;  Start 3/26/17 at 15:00;  Stop 3/27/17 at 03:29

;  Status DC


Albuterol Sulfate (Ventolin Neb Soln) 2.5 mg 1X  ONCE NEB  Last administered on 

3/26/17at 12:01;  Start 3/26/17 at 11:45;  Stop 3/26/17 at 11:56;  Status DC


Enoxaparin Sodium (Lovenox 40mg Syringe) 40 mg Q24H SQ  Last administered on 3/

27/17at 09:03;  Start 3/27/17 at 08:00


Albuterol Sulfate (Ventolin Neb Soln) 2.5 mg PRN QID  PRN NEB WHEEZING Last 

administered on 3/27/17at 11:06;  Start 3/27/17 at 09:45





Active Scripts


Active


Reported


Toprol Xl (Metoprolol Succinate) 50 Mg Tab.er.24h 1 Tab PO HS


Losartan Potassium 50 Mg Tablet 50 Mg PO HS


Norvasc (Amlodipine Besylate) 5 Mg Tablet 1 Tab PO DAILY


Levothyroxine Sodium 125 Mcg Tablet 1 Tab PO DAILY


Vitals/I & O





 Vital Sign - Last 24 Hours








 3/26/17 3/26/17 3/26/17 3/26/17





 13:16 13:30 14:00 14:30


 


Temp 97.3 97.3 97.7 97.9





 97.3 97.3 97.7 97.9


 


Pulse 91 71 70 69


 


Resp 16 16 16 16


 


B/P 138/47 116/49 166/48 114/48


 


Pulse Ox 92 91 88 92


 


O2 Delivery Nasal Cannula Nasal Cannula Nasal Cannula Nasal Cannula


 


O2 Flow Rate 4.0 4.0 4.0 4.0


 


    





    





 3/26/17 3/26/17 3/26/17 3/26/17





 15:00 16:01 19:00 20:00


 


Temp 97.9 98.6 96.9 





 97.9 98.6 96.9 


 


Pulse 62 73 58 


 


Resp 16 16 18 


 


B/P 109/45 150/58 156/51 


 


Pulse Ox 93 91 92 


 


O2 Delivery Nasal Cannula Nasal Cannula Nasal Cannula Venturi Mask


 


O2 Flow Rate 4.0 4.0 4.0 15.0


 


    





    





 3/26/17 3/26/17 3/26/17 3/27/17





 20:19 20:20 23:00 03:00


 


Temp   97.3 97.0





   97.3 97.0


 


Pulse 73 73 63 65


 


Resp   18 18


 


B/P 150/58 150/58 123/63 154/65


 


Pulse Ox   93 94


 


O2 Delivery   Nasal Cannula Nasal Cannula


 


O2 Flow Rate   4.0 4.0


 


    





    





 3/27/17 3/27/17 3/27/17 3/27/17





 07:00 07:03 08:00 09:04


 


Temp 96.8   





 96.8   


 


Pulse 71   71


 


Resp 20 20  


 


B/P 120/49   120/49


 


Pulse Ox 95 92  


 


O2 Delivery Venturi Mask Venturi Mask Room Air 


 


O2 Flow Rate 14.0 4.0 5.0 


 


    





    





 3/27/17 3/27/17 3/27/17 3/27/17





 11:00 11:02 11:32 12:11


 


Temp 99.3   





 99.3   


 


Pulse 79   


 


Resp 20 20 18 


 


B/P 124/53   


 


Pulse Ox 89  89 90


 


O2 Delivery Nasal Cannula  Room Air Nasal Cannula


 


O2 Flow Rate 3.0  5.0 4.0














 Intake and Output   


 


 3/26/17 3/26/17 3/27/17





 15:00 23:00 07:00


 


Intake Total 1450 ml 85 ml 260 ml


 


Output Total 300 ml 100 ml 470 ml


 


Balance 1150 ml -15 ml -210 ml














ROBERTO BEJARANO MD Mar 27, 2017 13:07

## 2017-03-28 VITALS — SYSTOLIC BLOOD PRESSURE: 134 MMHG | DIASTOLIC BLOOD PRESSURE: 56 MMHG

## 2017-03-28 VITALS — DIASTOLIC BLOOD PRESSURE: 66 MMHG | SYSTOLIC BLOOD PRESSURE: 136 MMHG

## 2017-03-28 VITALS — DIASTOLIC BLOOD PRESSURE: 56 MMHG | SYSTOLIC BLOOD PRESSURE: 124 MMHG

## 2017-03-28 VITALS — DIASTOLIC BLOOD PRESSURE: 51 MMHG | SYSTOLIC BLOOD PRESSURE: 117 MMHG

## 2017-03-28 VITALS — DIASTOLIC BLOOD PRESSURE: 50 MMHG | SYSTOLIC BLOOD PRESSURE: 122 MMHG

## 2017-03-28 VITALS — DIASTOLIC BLOOD PRESSURE: 66 MMHG | SYSTOLIC BLOOD PRESSURE: 123 MMHG

## 2017-03-28 RX ADMIN — ACETAMINOPHEN SCH MG: 500 TABLET ORAL at 09:35

## 2017-03-28 RX ADMIN — METOPROLOL SUCCINATE SCH MG: 50 TABLET, FILM COATED, EXTENDED RELEASE ORAL at 21:20

## 2017-03-28 RX ADMIN — INSULIN ASPART SCH UNITS: 100 INJECTION, SOLUTION INTRAVENOUS; SUBCUTANEOUS at 12:00

## 2017-03-28 RX ADMIN — INSULIN ASPART SCH UNITS: 100 INJECTION, SOLUTION INTRAVENOUS; SUBCUTANEOUS at 08:00

## 2017-03-28 RX ADMIN — ACETAMINOPHEN SCH MG: 500 TABLET ORAL at 14:51

## 2017-03-28 RX ADMIN — INSULIN ASPART SCH UNITS: 100 INJECTION, SOLUTION INTRAVENOUS; SUBCUTANEOUS at 17:00

## 2017-03-28 RX ADMIN — AMLODIPINE BESYLATE SCH MG: 5 TABLET ORAL at 09:35

## 2017-03-28 RX ADMIN — MULTIPLE VITAMINS W/ MINERALS TAB SCH TAB: TAB at 09:35

## 2017-03-28 RX ADMIN — ENOXAPARIN SODIUM SCH MG: 40 INJECTION SUBCUTANEOUS at 09:34

## 2017-03-28 RX ADMIN — VITAMIN D, TAB 1000IU (100/BT) SCH UNIT: 25 TAB at 09:35

## 2017-03-28 RX ADMIN — SENNOSIDES AND DOCUSATE SODIUM SCH TAB: 8.6; 5 TABLET ORAL at 09:35

## 2017-03-28 RX ADMIN — LOSARTAN POTASSIUM SCH MG: 50 TABLET, FILM COATED ORAL at 21:00

## 2017-03-28 RX ADMIN — POLYETHYLENE GLYCOL 3350 PRN GM: 17 POWDER, FOR SOLUTION ORAL at 10:03

## 2017-03-28 RX ADMIN — ACETAMINOPHEN SCH MG: 500 TABLET ORAL at 21:20

## 2017-03-28 RX ADMIN — LEVOTHYROXINE SODIUM SCH MCG: 125 TABLET ORAL at 06:45

## 2017-03-28 NOTE — PDOC
PROGRESS NOTES


Chief Complaint


Chief Complaint


- L femoral neck fracture, closed and displaced. 


- L distal clavicle fracture, closed and nondisplaced


- hypertension


- hypothyroidism





History of Present Illness


History of Present Illness


Patient with no acute events overnight. Pain of R hip, clavicle and ribs 

persists, but is well controlled. Dressings are clean, dry and intact. Pt feels 

ready to discharge to skilled nursing in [place], [place]. discussed case with case 

manager.





Vitals


Vitals





 Vital Signs








  Date Time  Temp Pulse Resp B/P Pulse Ox O2 Delivery O2 Flow Rate FiO2


 


3/28/17 11:00 98.2 75 18 123/66 85 Nasal Cannula 3.0 





 98.2       











Physical Exam


General:  Alert, Oriented X3, Cooperative, No acute distress


Heart:  Regular rate, Normal S1, Normal S2


Lungs:  Clear


Abdomen:  Soft


Extremities:  No edema


Skin:  No rashes, No breakdown





Labs


LABS





Laboratory Tests








Test


  3/27/17


17:42 3/28/17


12:05


 


Glucose (Fingerstick)


  129mg/dL


(70-99) 116mg/dL


(70-99)











Review of Systems


Review of Systems


denies fever, chills


denies chest pain, shortness of breath


pain at R clavicle, ribs, hip appropriately controlled





Assessment and Plan


Assessmemt and Plan


 Problems


Medical Problems:


(1) Fall


Status: Acute  





(2) Hypoxia


Status: Acute  





(3) Injury of clavicle


Status: Acute  





ASSESSMENT:


- L femoral neck fracture, closed and displaced. 


- L distal clavicle fracture, closed and nondisplaced


- hypertension


- hypothyroidism 





PLAN:


- probable discharge tomorrow to SNU, if ok with ortho


-  to SNU eval; pt prefers facility in [place]


- cont incentive spirometry 


- cont pain control


- cont wound care


- cont home meds 


- cont DVT prophylaxis 


- repeat daily labs; watch Hgb


- PTOT


- appreciate subspecialist input


Problems:  





Comment


Review of Relevant


I have reviewed the following items tino (where applicable) has been applied.


Labs





Laboratory Tests








Test


  3/27/17


03:35 3/27/17


17:42 3/28/17


12:05


 


Hemoglobin


  10.8g/dL


(12.0-15.5) 


  


 


 


Hematocrit


  32.5%


(36.0-47.0) 


  


 


 


Mean Corpuscular Hemoglobin


Concent 33g/dL (31-37) 


  


  


 


 


Glucose (Fingerstick)


  


  129mg/dL


(70-99) 116mg/dL


(70-99)








Laboratory Tests








Test


  3/27/17


17:42 3/28/17


12:05


 


Glucose (Fingerstick)


  129mg/dL


(70-99) 116mg/dL


(70-99)








Microbiology


3/24/17 Urine Culture - Final, Complete


          


3/24/17 Urine Culture Result 1 ([facility]) - Final, Complete


          


3/24/17 Urine Culture Result 2 ([facility]) - Final, Complete


Medications





 Current Medications


Acetaminophen (Tylenol) 650 mg 1X  ONCE PO  Last administered on 3/24/17at 22:07

;  Start 3/24/17 at 22:00;  Stop 3/24/17 at 22:01;  Status DC


Metoprolol Tartrate (Lopressor) 50 mg 1X  ONCE PO  Last administered on 3/24/

17at 23:43;  Start 3/24/17 at 23:30;  Stop 3/24/17 at 23:31;  Status DC


Ondansetron HCl (Zofran) 4 mg PRN Q8HRS  PRN IV NAUSEA/VOMITING;  Start 3/24/17 

at 23:30;  Stop 3/25/17 at 09:50;  Status DC


Acetaminophen (Tylenol) 650 mg PRN Q4HRS  PRN PO FEVER Last administered on 3/25

/17at 21:11;  Start 3/24/17 at 23:30;  Stop 3/25/17 at 23:29;  Status DC


Pneumococcal Polyvalent Vaccine (Do NOT chart on this placeholder) 1 each 1X  

ONCE MC ;  Start 3/25/17 at 03:00;  Stop 3/25/17 at 03:01;  Status UNV


Pneumococcal Polyvalent Vaccine (Pneumovax 23) 0.5 ml ONCE ONCE VAX IM ;  Start 

3/25/17 at 09:00;  Stop 3/25/17 at 09:01;  Status DC


Tramadol HCl (Ultram) 50 mg PRN Q6HRS  PRN PO PAIN Last administered on 3/27/

17at 21:05;  Start 3/25/17 at 03:30


Ondansetron HCl (Zofran) 4 mg PRN Q6HRS  PRN IV NAUSEA/VOMITING 1ST CHOICE Last 

administered on 3/26/17at 20:25;  Start 3/25/17 at 09:48;  Stop 3/27/17 at 14:49

;  Status DC


Insulin Aspart (Novolog) 0-9 UNITS TIDWMEALS SQ ;  Start 3/25/17 at 12:00


Dextrose 12.5 gm PRN Q15MIN  PRN IV SEE COMMENTS;  Start 3/25/17 at 10:00


Amlodipine Besylate (Norvasc) 5 mg DAILY PO  Last administered on 3/28/17at 09:

35;  Start 3/25/17 at 10:30


Levothyroxine Sodium (Synthroid) 125 mcg DAILY07 PO  Last administered on 3/28/

17at 06:45;  Start 3/25/17 at 10:30


Losartan Potassium (Cozaar) 50 mg HS PO  Last administered on 3/27/17at 21:02;  

Start 3/25/17 at 21:00


Metoprolol Succinate 50 mg 50 mg HS PO  Last administered on 3/27/17at 21:03;  

Start 3/25/17 at 21:00


Sodium Chloride (Iv Sodium Chloride 0.45%) 1,000 ml @  100 mls/hr Q10H IV  Last 

administered on 3/26/17at 20:26;  Start 3/25/17 at 10:00;  Stop 3/27/17 at 17:55

;  Status DC


Pantoprazole Sodium (Protonix Vial) 40 mg DAILYAC IVP  Last administered on 3/27

/17at 09:05;  Start 3/26/17 at 07:30


Calcium Carbonate/ Glycine 500 mg 500 mg PRN AFTMEALHC  PRN PO INDIGESTION Last 

administered on 3/25/17at 15:07;  Start 3/25/17 at 14:45


Cefazolin Sodium 50 ml @  100 mls/hr 1X  ONCE IV  Last administered on 3/25/

17at 17:36;  Start 3/25/17 at 17:00;  Stop 3/25/17 at 17:29;  Status DC


Clindamycin Phosphate (Cleocin 600 Mg Premix) 50 ml @  100 mls/hr 1X PREOP  PRN 

IV see comments;  Start 3/25/17 at 17:15


Tobramycin Sulfate 1.2 gm 1.2 gm PRN 1X  PRN TP FOR SURGERY;  Start 3/26/17 at 

06:45;  Stop 3/26/17 at 09:00;  Status DC


Tranexamic Acid 1000 mg/Sodium Chloride 60 ml @ 60 mls/hr 1X PERIOP  ONCE INJ  

Last administered on 3/26/17at 09:24;  Start 3/26/17 at 07:00;  Stop 3/26/17 at 

07:59;  Status DC


Tranexamic Acid/ Sodium Chloride (Cyklokapron/Iv Sodium Chloride 0.9% 50ml) 60 

ml @ 60 mls/hr 1X PERIOP  ONCE INJ  Last administered on 3/26/17at 09:24;  

Start 3/26/17 at 09:00;  Stop 3/26/17 at 09:59;  Status DC


Vancomycin HCl 1 gm STK-MED ONCE .ROUTE ;  Start 3/26/17 at 06:43;  Stop 3/26/

17 at 06:44;  Status DC


Tobramycin Sulfate 1.2 gm STK-MED ONCE .ROUTE ;  Start 3/26/17 at 06:43;  Stop 3

/26/17 at 06:44;  Status DC


Epinephrine HCl 30 mg 30 mg STK-MED ONCE .ROUTE ;  Start 3/26/17 at 06:43;  

Stop 3/26/17 at 06:44;  Status DC


Morphine Sulfate/ Ketorolac Tromethamine/ Ropivacaine/ Epinephrine HCl/ Sodium 

Chloride (Morphine 5mg Syringe/Toradol/ Naropin 0.5%/ Adrenalin/Iv Sodium 

Chloride 0.9% 100ml) 100.5 ml @  100.5 mls/ hr 1X PERIOP  ONCE INT ART  Last 

administered on 3/26/17at 09:24;  Start 3/26/17 at 07:00;  Stop 3/26/17 at 07:59

;  Status DC


Fentanyl Citrate 250 mcg 250 mcg STK-MED ONCE .ROUTE ;  Start 3/26/17 at 07:32;

  Stop 3/26/17 at 07:33;  Status DC


Propofol (Diprivan) 20 ml @ As Directed STK-MED ONCE IV ;  Start 3/26/17 at 07:

32;  Stop 3/26/17 at 07:33;  Status DC


Dexamethasone Sodium Phosphate (Decadron) 20 mg STK-MED ONCE .ROUTE ;  Start 3/

26/17 at 07:32;  Stop 3/26/17 at 07:33;  Status DC


Ondansetron HCl (Zofran) 4 mg STK-MED ONCE .ROUTE ;  Start 3/26/17 at 07:32;  

Stop 3/26/17 at 07:33;  Status DC


Lidocaine HCl 100 mg STK-MED ONCE .ROUTE ;  Start 3/26/17 at 07:32;  Stop 3/26/

17 at 07:33;  Status DC


Ondansetron HCl (Zofran) 4 mg PRN Q6HRS  PRN IV Nausea;  Start 3/26/17 at 08:15

;  Stop 3/27/17 at 08:14;  Status DC


Fentanyl Citrate (Fentanyl 2ml Vial) 25 mcg PRN Q5MIN  PRN IV MILD PAIN;  Start 

3/26/17 at 08:15;  Stop 3/27/17 at 08:14;  Status DC


Fentanyl Citrate (Fentanyl 2ml Vial) 50 mcg PRN Q5MIN  PRN IV MODERATE PAIN;  

Start 3/26/17 at 08:15;  Stop 3/27/17 at 08:14;  Status DC


Morphine Sulfate 1 mg 1 mg PRN Q10MIN  PRN IV SEVERE PAIN;  Start 3/26/17 at 08:

15;  Stop 3/27/17 at 08:14;  Status DC


Lactated Ringer's (Iv Lactated Ringers) 1,000 ml @  0 mls/hr Q0M IV ;  Start 3/

26/17 at 08:05;  Stop 3/26/17 at 20:04;  Status DC


Lidocaine HCl 2 ml 1X PRN  PRN ID IV START;  Start 3/26/17 at 08:15;  Stop 3/27/

17 at 08:14;  Status DC


Hydromorphone HCl (Dilaudid) 0.5 mg PRN Q10MIN  PRN IV SEV PAIN,Second choice;  

Start 3/26/17 at 08:15;  Stop 3/27/17 at 08:14;  Status DC


Prochlorperazine Edisylate (Compazine) 5 mg PACU PRN  PRN IV NAUSEA Last 

administered on 3/26/17at 11:06;  Start 3/26/17 at 08:15;  Stop 3/27/17 at 08:14

;  Status DC


Phenylephrine HCl 1 mg STK-MED ONCE IV ;  Start 3/26/17 at 09:30;  Stop 3/26/17 

at 09:31;  Status DC


Sevoflurane 90 ml 90 ml STK-MED ONCE IH ;  Start 3/26/17 at 09:30;  Stop 3/26/

17 at 09:31;  Status DC


Cefazolin Sodium/ Dextrose (Ancef 2gm Premix) 50 ml @  100 mls/hr 1X  ONCE IV  

Last administered on 3/26/17at 09:06;  Start 3/26/17 at 09:45;  Stop 3/26/17 at 

10:14;  Status DC


Vancomycin HCl 1 gm STK-MED ONCE .ROUTE ;  Start 3/26/17 at 10:02;  Stop 3/26/

17 at 10:03;  Status DC


Tobramycin Sulfate 1.2 gm STK-MED ONCE .ROUTE ;  Start 3/26/17 at 10:02;  Stop 3

/26/17 at 10:03;  Status DC


Morphine Sulfate (Morphine Preservative Free) 5 mg STK-MED ONCE .ROUTE ;  Start 

3/26/17 at 10:30;  Stop 3/26/17 at 10:31;  Status DC


Oxycodone HCl (Roxicodone) 5 mg PRN Q3HRS  PRN PO PAIN;  Start 3/26/17 at 10:45


Morphine Sulfate 2 mg PRN Q1HR  PRN IV PAIN;  Start 3/26/17 at 10:45


Fentanyl Citrate (Fentanyl 2ml Vial) 25 mcg PRN Q1HR  PRN IV PAIN Last 

administered on 3/27/17at 14:06;  Start 3/26/17 at 10:45;  Stop 3/27/17 at 15:35

;  Status DC


Acetaminophen (Tylenol) 500 mg TID PO  Last administered on 3/28/17at 09:35;  

Start 3/26/17 at 14:00


Multivitamins (Thera M Plus) 1 tab DAILY PO  Last administered on 3/28/17at 09:

35;  Start 3/27/17 at 09:00


Senna/Docusate Sodium (Senna Plus) 1 tab DAILY PO  Last administered on 3/28/

17at 09:35;  Start 3/27/17 at 09:00


Polyethylene Glycol (miraLAX PACKET) 17 gm PRN DAILY  PRN PO CONSTIPATION Last 

administered on 3/28/17at 10:03;  Start 3/26/17 at 10:45


Vitamin D 1000 unit 1,000 unit DAILY PO  Last administered on 3/28/17at 09:35;  

Start 3/27/17 at 09:00


Sodium Chloride (Iv Sodium Chloride 0.45%) 1,000 ml @  75 mls/hr [id number] IV ;  

Start 3/26/17 at 10:44;  Stop 3/26/17 at 20:39;  Status DC


Ondansetron HCl (Zofran) 4 mg PRN Q4HRS  PRN IV NAUSEA/VOMITING;  Start 3/26/17 

at 10:45


Aspirin (Kyle Aspirin) 325 mg BID PO ;  Start 3/26/17 at 21:00;  Status UNV


Magnesium Hydroxide (Milk Of Magnesia) 2,400 mg 1X PRN  PRN PO CONSTIPATION 

Last administered on 3/27/17at 21:55;  Start 3/27/17 at 06:00;  Stop 3/28/17 at 

05:59;  Status DC


Bisacodyl (Dulcolax Supp) 10 mg 1X PRN  PRN MI CONSTIPATION;  Start 3/27/17 at 

16:00;  Stop 3/28/17 at 15:59


Acetaminophen/ Hydrocodone Bitart (Lortab 7.5/325) 1 tab PRN Q4HRS  PRN PO PAIN

;  Start 3/26/17 at 10:45


Morphine Sulfate 4 mg PRN Q2HR  PRN IV PAIN;  Start 3/26/17 at 10:45


Acetaminophen/ Hydrocodone Bitart (Lortab 7.5/325) 2 tab PRN Q4HRS  PRN PO PAIN

;  Start 3/26/17 at 10:45


Dextrose 12.5 gm 12.5 gm PRN Q15MIN  PRN IV SEE COMMENTS;  Start 3/26/17 at 10:

45;  Stop 3/27/17 at 14:49;  Status DC


Cefazolin Sodium/ Dextrose 50 ml @  100 mls/hr Q6H IV ;  Start 3/26/17 at 11:00

;  Stop 3/26/17 at 23:29;  Status UNV


Clindamycin Phosphate (Cleocin 600 Mg Premix) 50 ml @  100 mls/hr Q6H IV  Last 

administered on 3/27/17at 03:17;  Start 3/26/17 at 15:00;  Stop 3/27/17 at 03:29

;  Status DC


Albuterol Sulfate (Ventolin Neb Soln) 2.5 mg 1X  ONCE NEB  Last administered on 

3/26/17at 12:01;  Start 3/26/17 at 11:45;  Stop 3/26/17 at 11:56;  Status DC


Enoxaparin Sodium (Lovenox 40mg Syringe) 40 mg Q24H SQ  Last administered on 3/

28/17at 09:34;  Start 3/27/17 at 08:00


Albuterol Sulfate (Ventolin Neb Soln) 2.5 mg PRN QID  PRN NEB WHEEZING Last 

administered on 3/27/17at 11:06;  Start 3/27/17 at 09:45


Fentanyl Citrate (Fentanyl 2ml Vial) 25 mcg PRN Q4HRS  PRN IV PAIN;  Start 3/27/

17 at 16:00





Active Scripts


Active


Reported


Toprol Xl (Metoprolol Succinate) 50 Mg Tab.er.24h 1 Tab PO HS


Losartan Potassium 50 Mg Tablet 50 Mg PO HS


Norvasc (Amlodipine Besylate) 5 Mg Tablet 1 Tab PO DAILY


Levothyroxine Sodium 125 Mcg Tablet 1 Tab PO DAILY


Vitals/I & O





 Vital Sign - Last 24 Hours








 3/27/17 3/27/17 3/27/17 3/27/17





 14:06 14:06 14:36 15:00


 


Temp    96.8





    96.8


 


Pulse    76


 


Resp 16 16 18 20


 


B/P    118/54


 


Pulse Ox   90 90


 


O2 Delivery   Room Air Nasal Cannula


 


O2 Flow Rate   4.0 5.0


 


    





    





 3/27/17 3/27/17 3/27/17 3/27/17





 15:23 19:00 20:00 21:02


 


Temp  98.6  





  98.6  


 


Pulse  91  91


 


Resp 18 18  


 


B/P  133/54  133/54


 


Pulse Ox  90  


 


O2 Delivery  Nasal Cannula Nasal Cannula 


 


O2 Flow Rate  5.0 4.0 


 


    





    





 3/27/17 3/27/17 3/27/17 3/28/17





 21:03 21:05 23:07 03:06


 


Temp   98.2 98.4





   98.2 98.4


 


Pulse 91  85 78


 


Resp  22 18 18


 


B/P 133/54  121/49 117/51


 


Pulse Ox  90 86 88


 


O2 Delivery  Nasal Cannula Nasal Cannula Nasal Cannula


 


O2 Flow Rate  4.0 5.0 5.0


 


    





    





 3/28/17 3/28/17 3/28/17 





 07:00 09:35 11:00 


 


Temp 97.9  98.2 





 97.9  98.2 


 


Pulse 78 78 75 


 


Resp 16  18 


 


B/P 136/66 136/66 123/66 


 


Pulse Ox 87  85 


 


O2 Delivery Nasal Cannula  Nasal Cannula 


 


O2 Flow Rate 5.0  3.0 














 Intake and Output   


 


 3/27/17 3/27/17 3/28/17





 15:00 23:00 07:00


 


Intake Total 500 ml  


 


Output Total  750 ml 350 ml


 


Balance 500 ml -750 ml -350 ml














ELIJAH DAVILA III DO Mar 28, 2017 12:36

## 2017-03-28 NOTE — PDOC
PROGRESS NOTES


Subjective


Subjective


Pain is controlled.





Objective


Vital Signs





 Vital Signs








  Date Time  Temp Pulse Resp B/P Pulse Ox O2 Delivery O2 Flow Rate FiO2


 


3/28/17 11:00 98.2 75 18 123/66 85 Nasal Cannula 3.0 





 98.2       








Physical Exam


Sitting up in bed with family at bedside. Ice pack to left shoulder. Left hip 

dressing dry and intact. Calf soft and nontender, with a negative Cecily's sign. 

Gentle circumduction of the hip is not painful. Good dorsiflexion and 

plantarflexion. NVI.


Labs





Laboratory Tests








Test


  3/27/17


03:35 3/27/17


17:42 3/28/17


12:05


 


Hemoglobin


  10.8g/dL


(12.0-15.5) 


  


 


 


Hematocrit


  32.5%


(36.0-47.0) 


  


 


 


Mean Corpuscular Hemoglobin


Concent 33g/dL (31-37) 


  


  


 


 


Glucose (Fingerstick)


  


  129mg/dL


(70-99) 116mg/dL


(70-99)








Laboratory Tests








Test


  3/27/17


17:42 3/28/17


12:05


 


Glucose (Fingerstick)


  129mg/dL


(70-99) 116mg/dL


(70-99)











Assessment


Assessment


POD #2 left hip bipolar


Problems:  





Plan


Plan of Care


Continue PT/OT. She may WBAT with a walker. Continue abduction pillow and hip 

precautions.








NORBERTO MARRERO Mar 28, 2017 14:59

## 2017-03-29 VITALS — SYSTOLIC BLOOD PRESSURE: 144 MMHG | DIASTOLIC BLOOD PRESSURE: 63 MMHG

## 2017-03-29 VITALS — DIASTOLIC BLOOD PRESSURE: 69 MMHG | SYSTOLIC BLOOD PRESSURE: 144 MMHG

## 2017-03-29 VITALS — SYSTOLIC BLOOD PRESSURE: 135 MMHG | DIASTOLIC BLOOD PRESSURE: 70 MMHG

## 2017-03-29 LAB
ANION GAP SERPL CALC-SCNC: 10 MMOL/L (ref 6–14)
BASOPHILS # BLD AUTO: 0 X10^3/UL (ref 0–0.2)
BASOPHILS NFR BLD: 0 % (ref 0–3)
BUN SERPL-MCNC: 17 MG/DL (ref 7–20)
CALCIUM SERPL-MCNC: 8.2 MG/DL (ref 8.5–10.1)
CHLORIDE SERPL-SCNC: 100 MMOL/L (ref 98–107)
CO2 SERPL-SCNC: 25 MMOL/L (ref 21–32)
CREAT SERPL-MCNC: 0.8 MG/DL (ref 0.6–1)
EOSINOPHIL NFR BLD: 6 % (ref 0–3)
ERYTHROCYTE [DISTWIDTH] IN BLOOD BY AUTOMATED COUNT: 14.4 % (ref 11.5–14.5)
GFR SERPLBLD BASED ON 1.73 SQ M-ARVRAT: 67.8 ML/MIN
GLUCOSE SERPL-MCNC: 107 MG/DL (ref 70–99)
HCT VFR BLD CALC: 31.2 % (ref 36–47)
HGB BLD-MCNC: 10.3 G/DL (ref 12–15.5)
LYMPHOCYTES # BLD: 0.9 X10^3/UL (ref 1–4.8)
LYMPHOCYTES NFR BLD AUTO: 12 % (ref 24–48)
MCH RBC QN AUTO: 30 PG (ref 25–35)
MCHC RBC AUTO-ENTMCNC: 33 G/DL (ref 31–37)
MCV RBC AUTO: 90 FL (ref 79–100)
MONOCYTES NFR BLD: 13 % (ref 0–9)
NEUTROPHILS NFR BLD AUTO: 69 % (ref 31–73)
PLATELET # BLD AUTO: 142 X10^3/UL (ref 140–400)
POTASSIUM SERPL-SCNC: 4.1 MMOL/L (ref 3.5–5.1)
RBC # BLD AUTO: 3.48 X10^6/UL (ref 3.5–5.4)
SODIUM SERPL-SCNC: 135 MMOL/L (ref 136–145)
WBC # BLD AUTO: 7.3 X10^3/UL (ref 4–11)

## 2017-03-29 RX ADMIN — VITAMIN D, TAB 1000IU (100/BT) SCH UNIT: 25 TAB at 09:27

## 2017-03-29 RX ADMIN — LEVOTHYROXINE SODIUM SCH MCG: 125 TABLET ORAL at 06:40

## 2017-03-29 RX ADMIN — INSULIN ASPART SCH UNITS: 100 INJECTION, SOLUTION INTRAVENOUS; SUBCUTANEOUS at 08:00

## 2017-03-29 RX ADMIN — ENOXAPARIN SODIUM SCH MG: 40 INJECTION SUBCUTANEOUS at 09:29

## 2017-03-29 RX ADMIN — AMLODIPINE BESYLATE SCH MG: 5 TABLET ORAL at 09:28

## 2017-03-29 RX ADMIN — ACETAMINOPHEN SCH MG: 500 TABLET ORAL at 09:28

## 2017-03-29 RX ADMIN — MULTIPLE VITAMINS W/ MINERALS TAB SCH TAB: TAB at 09:27

## 2017-03-29 RX ADMIN — SENNOSIDES AND DOCUSATE SODIUM SCH TAB: 8.6; 5 TABLET ORAL at 09:27

## 2017-03-29 NOTE — PATHOLOGY
PATHOLOGY REPORT 

 

*    *    *    *    *    *    *    * 

 FINAL DIAGNOSIS:

Femoral head, left hip bipolar hemiarthroplasty:

- Focal fragmentation of bony trabeculae and recent intertrabecular

hemorrhage consistent with fracture.

- Degenerative arthritis. 

 

COMMENT:

There is no evidence of malignancy. 

(JPM:; d/t: 3/29/17) 

 

 

REPORT ELECTRONICALLY SIGNED BY:   Brandt Santos M.D.

DATE/TIME:   3/29/2017 10:52

*    *    *    *    *    *    *    *

 

GROSS PATHOLOGY:

Received in formalin labeled "Summer Rudolph, left hip bone and

tissue," is a femoral head measuring 4.5 x 4.5 x 3.8 cm in greatest

dimensions.  The articular surface is pale tan to light tan and

smooth in appearance.  Sectioning reveals a white-tan marrow space,

with the distal most aspect hemorrhagic in appearance, consistent

with a fracture site.  Representative tissue from the fracture site

is submitted in cassette A1, following decalcification.

([facility]; 3/28/2017)

 

 

 INITIAL CPT CODE(S):

A; 23496, 83290

Professional services performed by [facility] [place] 

[place] [address]

 

  Technical services performed by [facility] at [address] [place] [address].

  

 SPECIMEN(S) RECEIVED:

A.Left hip bone and tissue

 

 CLINICAL HISTORY:

Left hip fracture 

 

 PATIENT:  SUMMER RUDOLPH

/AGE:  1929 (Age: 87)  

PATIENT #:  [id number]

ACCESSION #:  [id number]          ALT CASE #:   

SPECIMEN COLLECTION DATE:  3/26/2017

SPECIMEN RECEIVED DATE:  3/27/2017

   

[facility] - [address] [place] [address] - PHONE: 

[phone]

* * *  END OF REPORT  * * *

## 2017-03-29 NOTE — PDOC
PROGRESS NOTES


Chief Complaint


Chief Complaint


- L femoral neck fracture, closed and displaced. 


- L distal clavicle fracture, closed and nondisplaced


- hypertension


- hypothyroidism





History of Present Illness


History of Present Illness


Patient with no acute events overnight. Family and friends at bedside. Pain of 

R hip, clavicle and ribs persists, but is well controlled. Dressings are clean, 

dry and intact. Pt ready for discharge to SNU; [facility] in [place], [place]. 

Discussed case with RN and daughter. Explained need for rehab to daughter, who 

agrees with the plan.





Vitals


Vitals





 Vital Signs








  Date Time  Temp Pulse Resp B/P Pulse Ox O2 Delivery O2 Flow Rate FiO2


 


3/29/17 09:28  78  144/69    


 


3/29/17 08:00      Nasal Cannula 3.0 


 


3/29/17 07:00 97.7  22  90   





 97.7       











Physical Exam


General:  Alert, Oriented X3, Cooperative, No acute distress


Heart:  Regular rate, Normal S1, Normal S2


Lungs:  Clear


Abdomen:  Soft


Extremities:  No edema


Skin:  No rashes, No breakdown





Labs


LABS





Laboratory Tests








Test


  3/28/17


17:54 3/29/17


04:30


 


Glucose (Fingerstick)


  102mg/dL


(70-99) 


 


 


White Blood Count


  


  7.3x10^3/uL


(4.0-11.0)


 


Red Blood Count


  


  3.48x10^6/uL


(3.50-5.40)


 


Hemoglobin


  


  10.3g/dL


(12.0-15.5)


 


Hematocrit


  


  31.2%


(36.0-47.0)


 


Mean Corpuscular Volume  90fL () 


 


Mean Corpuscular Hemoglobin  30pg (25-35) 


 


Mean Corpuscular Hemoglobin


Concent 


  33g/dL (31-37) 


 


 


Red Cell Distribution Width


  


  14.4%


(11.5-14.5)


 


Platelet Count


  


  142x10^3/uL


(140-400)


 


Neutrophils (%) (Auto)  69% (31-73) 


 


Lymphocytes (%) (Auto)  12% (24-48) 


 


Monocytes (%) (Auto)  13% (0-9) 


 


Eosinophils (%) (Auto)  6% (0-3) 


 


Basophils (%) (Auto)  0% (0-3) 


 


Neutrophils # (Auto)


  


  5.0x10^3uL


(1.8-7.7)


 


Lymphocytes # (Auto)


  


  0.9x10^3/uL


(1.0-4.8)


 


Monocytes # (Auto)


  


  0.9x10^3/uL


(0.0-1.1)


 


Eosinophils # (Auto)


  


  0.4x10^3/uL


(0.0-0.7)


 


Basophils # (Auto)


  


  0.0x10^3/uL


(0.0-0.2)


 


Sodium Level


  


  135mmol/L


(136-145)


 


Potassium Level


  


  4.1mmol/L


(3.5-5.1)


 


Chloride Level


  


  100mmol/L


()


 


Carbon Dioxide Level


  


  25mmol/L


(21-32)


 


Anion Gap  10 (6-14) 


 


Blood Urea Nitrogen  17mg/dL (7-20) 


 


Creatinine


  


  0.8mg/dL


(0.6-1.0)


 


Estimated GFR


(Cockcroft-Gault) 


  67.8 


 


 


Glucose Level


  


  107mg/dL


(70-99)


 


Calcium Level


  


  8.2mg/dL


(8.5-10.1)











Review of Systems


Review of Systems


denies fever, chills


pain at R clavicle, ribs, hip appropriately controlled





Assessment and Plan


Assessmemt and Plan


 Problems


Medical Problems:


(1) Fall


Status: Acute  





(2) Hypoxia


Status: Acute  





(3) Injury of clavicle


Status: Acute  





ASSESSMENT:


- L femoral neck fracture, closed and displaced. 


- L distal clavicle fracture, closed and nondisplaced


- hypertension


- hypothyroidism 





PLAN:


- probable discharge today to SNU; [facility] in [place]


- cont home meds


- cont pain control


- cont wound care


- appreciate subspecialist input


Problems:  





Comment


Review of Relevant


I have reviewed the following items tino (where applicable) has been applied.


Labs





Laboratory Tests








Test


  3/27/17


17:42 3/28/17


12:05 3/28/17


17:54 3/29/17


04:30


 


Glucose (Fingerstick)


  129mg/dL


(70-99) 116mg/dL


(70-99) 102mg/dL


(70-99) 


 


 


White Blood Count


  


  


  


  7.3x10^3/uL


(4.0-11.0)


 


Red Blood Count


  


  


  


  3.48x10^6/uL


(3.50-5.40)


 


Hemoglobin


  


  


  


  10.3g/dL


(12.0-15.5)


 


Hematocrit


  


  


  


  31.2%


(36.0-47.0)


 


Mean Corpuscular Volume    90fL () 


 


Mean Corpuscular Hemoglobin    30pg (25-35) 


 


Mean Corpuscular Hemoglobin


Concent 


  


  


  33g/dL (31-37) 


 


 


Red Cell Distribution Width


  


  


  


  14.4%


(11.5-14.5)


 


Platelet Count


  


  


  


  142x10^3/uL


(140-400)


 


Neutrophils (%) (Auto)    69% (31-73) 


 


Lymphocytes (%) (Auto)    12% (24-48) 


 


Monocytes (%) (Auto)    13% (0-9) 


 


Eosinophils (%) (Auto)    6% (0-3) 


 


Basophils (%) (Auto)    0% (0-3) 


 


Neutrophils # (Auto)


  


  


  


  5.0x10^3uL


(1.8-7.7)


 


Lymphocytes # (Auto)


  


  


  


  0.9x10^3/uL


(1.0-4.8)


 


Monocytes # (Auto)


  


  


  


  0.9x10^3/uL


(0.0-1.1)


 


Eosinophils # (Auto)


  


  


  


  0.4x10^3/uL


(0.0-0.7)


 


Basophils # (Auto)


  


  


  


  0.0x10^3/uL


(0.0-0.2)


 


Sodium Level


  


  


  


  135mmol/L


(136-145)


 


Potassium Level


  


  


  


  4.1mmol/L


(3.5-5.1)


 


Chloride Level


  


  


  


  100mmol/L


()


 


Carbon Dioxide Level


  


  


  


  25mmol/L


(21-32)


 


Anion Gap    10 (6-14) 


 


Blood Urea Nitrogen    17mg/dL (7-20) 


 


Creatinine


  


  


  


  0.8mg/dL


(0.6-1.0)


 


Estimated GFR


(Cockcroft-Gault) 


  


  


  67.8 


 


 


Glucose Level


  


  


  


  107mg/dL


(70-99)


 


Calcium Level


  


  


  


  8.2mg/dL


(8.5-10.1)








Laboratory Tests








Test


  3/28/17


17:54 3/29/17


04:30


 


Glucose (Fingerstick)


  102mg/dL


(70-99) 


 


 


White Blood Count


  


  7.3x10^3/uL


(4.0-11.0)


 


Red Blood Count


  


  3.48x10^6/uL


(3.50-5.40)


 


Hemoglobin


  


  10.3g/dL


(12.0-15.5)


 


Hematocrit


  


  31.2%


(36.0-47.0)


 


Mean Corpuscular Volume  90fL () 


 


Mean Corpuscular Hemoglobin  30pg (25-35) 


 


Mean Corpuscular Hemoglobin


Concent 


  33g/dL (31-37) 


 


 


Red Cell Distribution Width


  


  14.4%


(11.5-14.5)


 


Platelet Count


  


  142x10^3/uL


(140-400)


 


Neutrophils (%) (Auto)  69% (31-73) 


 


Lymphocytes (%) (Auto)  12% (24-48) 


 


Monocytes (%) (Auto)  13% (0-9) 


 


Eosinophils (%) (Auto)  6% (0-3) 


 


Basophils (%) (Auto)  0% (0-3) 


 


Neutrophils # (Auto)


  


  5.0x10^3uL


(1.8-7.7)


 


Lymphocytes # (Auto)


  


  0.9x10^3/uL


(1.0-4.8)


 


Monocytes # (Auto)


  


  0.9x10^3/uL


(0.0-1.1)


 


Eosinophils # (Auto)


  


  0.4x10^3/uL


(0.0-0.7)


 


Basophils # (Auto)


  


  0.0x10^3/uL


(0.0-0.2)


 


Sodium Level


  


  135mmol/L


(136-145)


 


Potassium Level


  


  4.1mmol/L


(3.5-5.1)


 


Chloride Level


  


  100mmol/L


()


 


Carbon Dioxide Level


  


  25mmol/L


(21-32)


 


Anion Gap  10 (6-14) 


 


Blood Urea Nitrogen  17mg/dL (7-20) 


 


Creatinine


  


  0.8mg/dL


(0.6-1.0)


 


Estimated GFR


(Cockcroft-Gault) 


  67.8 


 


 


Glucose Level


  


  107mg/dL


(70-99)


 


Calcium Level


  


  8.2mg/dL


(8.5-10.1)








Microbiology


3/24/17 Urine Culture - Final, Complete


          


3/24/17 Urine Culture Result 1 ([facility]) - Final, Complete


          


3/24/17 Urine Culture Result 2 ([facility]) - Final, Complete


Medications





 Current Medications


Acetaminophen (Tylenol) 650 mg 1X  ONCE PO  Last administered on 3/24/17at 22:07

;  Start 3/24/17 at 22:00;  Stop 3/24/17 at 22:01;  Status DC


Metoprolol Tartrate (Lopressor) 50 mg 1X  ONCE PO  Last administered on 3/24/

17at 23:43;  Start 3/24/17 at 23:30;  Stop 3/24/17 at 23:31;  Status DC


Ondansetron HCl (Zofran) 4 mg PRN Q8HRS  PRN IV NAUSEA/VOMITING;  Start 3/24/17 

at 23:30;  Stop 3/25/17 at 09:50;  Status DC


Acetaminophen (Tylenol) 650 mg PRN Q4HRS  PRN PO FEVER Last administered on 3/25

/17at 21:11;  Start 3/24/17 at 23:30;  Stop 3/25/17 at 23:29;  Status DC


Pneumococcal Polyvalent Vaccine (Do NOT chart on this placeholder) 1 each 1X  

ONCE MC ;  Start 3/25/17 at 03:00;  Stop 3/25/17 at 03:01;  Status UNV


Pneumococcal Polyvalent Vaccine (Pneumovax 23) 0.5 ml ONCE ONCE VAX IM ;  Start 

3/25/17 at 09:00;  Stop 3/25/17 at 09:01;  Status DC


Tramadol HCl (Ultram) 50 mg PRN Q6HRS  PRN PO PAIN Last administered on 3/27/

17at 21:05;  Start 3/25/17 at 03:30


Ondansetron HCl (Zofran) 4 mg PRN Q6HRS  PRN IV NAUSEA/VOMITING 1ST CHOICE Last 

administered on 3/26/17at 20:25;  Start 3/25/17 at 09:48;  Stop 3/27/17 at 14:49

;  Status DC


Insulin Aspart (Novolog) 0-9 UNITS TIDWMEALS SQ ;  Start 3/25/17 at 12:00


Dextrose 12.5 gm PRN Q15MIN  PRN IV SEE COMMENTS;  Start 3/25/17 at 10:00


Amlodipine Besylate (Norvasc) 5 mg DAILY PO  Last administered on 3/29/17at 09:

28;  Start 3/25/17 at 10:30


Levothyroxine Sodium (Synthroid) 125 mcg DAILY07 PO  Last administered on 3/29/

17at 06:40;  Start 3/25/17 at 10:30


Losartan Potassium (Cozaar) 50 mg HS PO  Last administered on 3/28/17at 21:00;  

Start 3/25/17 at 21:00


Metoprolol Succinate 50 mg 50 mg HS PO  Last administered on 3/28/17at 21:20;  

Start 3/25/17 at 21:00


Sodium Chloride (Iv Sodium Chloride 0.45%) 1,000 ml @  100 mls/hr Q10H IV  Last 

administered on 3/26/17at 20:26;  Start 3/25/17 at 10:00;  Stop 3/27/17 at 17:55

;  Status DC


Pantoprazole Sodium (Protonix Vial) 40 mg DAILYAC IVP  Last administered on 3/27

/17at 09:05;  Start 3/26/17 at 07:30;  Stop 3/28/17 at 13:06;  Status DC


Calcium Carbonate/ Glycine 500 mg 500 mg PRN AFTMEALHC  PRN PO INDIGESTION Last 

administered on 3/25/17at 15:07;  Start 3/25/17 at 14:45


Cefazolin Sodium 50 ml @  100 mls/hr 1X  ONCE IV  Last administered on 3/25/

17at 17:36;  Start 3/25/17 at 17:00;  Stop 3/25/17 at 17:29;  Status DC


Clindamycin Phosphate (Cleocin 600 Mg Premix) 50 ml @  100 mls/hr 1X PREOP  PRN 

IV see comments;  Start 3/25/17 at 17:15;  Stop 3/28/17 at 13:01;  Status DC


Tobramycin Sulfate 1.2 gm 1.2 gm PRN 1X  PRN TP FOR SURGERY;  Start 3/26/17 at 

06:45;  Stop 3/26/17 at 09:00;  Status DC


Tranexamic Acid 1000 mg/Sodium Chloride 60 ml @ 60 mls/hr 1X PERIOP  ONCE INJ  

Last administered on 3/26/17at 09:24;  Start 3/26/17 at 07:00;  Stop 3/26/17 at 

07:59;  Status DC


Tranexamic Acid/ Sodium Chloride (Cyklokapron/Iv Sodium Chloride 0.9% 50ml) 60 

ml @ 60 mls/hr 1X PERIOP  ONCE INJ  Last administered on 3/26/17at 09:24;  

Start 3/26/17 at 09:00;  Stop 3/26/17 at 09:59;  Status DC


Vancomycin HCl 1 gm STK-MED ONCE .ROUTE ;  Start 3/26/17 at 06:43;  Stop 3/26/

17 at 06:44;  Status DC


Tobramycin Sulfate 1.2 gm STK-MED ONCE .ROUTE ;  Start 3/26/17 at 06:43;  Stop 3

/26/17 at 06:44;  Status DC


Epinephrine HCl 30 mg 30 mg STK-MED ONCE .ROUTE ;  Start 3/26/17 at 06:43;  

Stop 3/26/17 at 06:44;  Status DC


Morphine Sulfate/ Ketorolac Tromethamine/ Ropivacaine/ Epinephrine HCl/ Sodium 

Chloride (Morphine 5mg Syringe/Toradol/ Naropin 0.5%/ Adrenalin/Iv Sodium 

Chloride 0.9% 100ml) 100.5 ml @  100.5 mls/ hr 1X PERIOP  ONCE INT ART  Last 

administered on 3/26/17at 09:24;  Start 3/26/17 at 07:00;  Stop 3/26/17 at 07:59

;  Status DC


Fentanyl Citrate 250 mcg 250 mcg STK-MED ONCE .ROUTE ;  Start 3/26/17 at 07:32;

  Stop 3/26/17 at 07:33;  Status DC


Propofol (Diprivan) 20 ml @ As Directed STK-MED ONCE IV ;  Start 3/26/17 at 07:

32;  Stop 3/26/17 at 07:33;  Status DC


Dexamethasone Sodium Phosphate (Decadron) 20 mg STK-MED ONCE .ROUTE ;  Start 3/

26/17 at 07:32;  Stop 3/26/17 at 07:33;  Status DC


Ondansetron HCl (Zofran) 4 mg STK-MED ONCE .ROUTE ;  Start 3/26/17 at 07:32;  

Stop 3/26/17 at 07:33;  Status DC


Lidocaine HCl 100 mg STK-MED ONCE .ROUTE ;  Start 3/26/17 at 07:32;  Stop 3/26/

17 at 07:33;  Status DC


Ondansetron HCl (Zofran) 4 mg PRN Q6HRS  PRN IV Nausea;  Start 3/26/17 at 08:15

;  Stop 3/27/17 at 08:14;  Status DC


Fentanyl Citrate (Fentanyl 2ml Vial) 25 mcg PRN Q5MIN  PRN IV MILD PAIN;  Start 

3/26/17 at 08:15;  Stop 3/27/17 at 08:14;  Status DC


Fentanyl Citrate (Fentanyl 2ml Vial) 50 mcg PRN Q5MIN  PRN IV MODERATE PAIN;  

Start 3/26/17 at 08:15;  Stop 3/27/17 at 08:14;  Status DC


Morphine Sulfate 1 mg 1 mg PRN Q10MIN  PRN IV SEVERE PAIN;  Start 3/26/17 at 08:

15;  Stop 3/27/17 at 08:14;  Status DC


Lactated Ringer's (Iv Lactated Ringers) 1,000 ml @  0 mls/hr Q0M IV ;  Start 3/

26/17 at 08:05;  Stop 3/26/17 at 20:04;  Status DC


Lidocaine HCl 2 ml 1X PRN  PRN ID IV START;  Start 3/26/17 at 08:15;  Stop 3/27/

17 at 08:14;  Status DC


Hydromorphone HCl (Dilaudid) 0.5 mg PRN Q10MIN  PRN IV SEV PAIN,Second choice;  

Start 3/26/17 at 08:15;  Stop 3/27/17 at 08:14;  Status DC


Prochlorperazine Edisylate (Compazine) 5 mg PACU PRN  PRN IV NAUSEA Last 

administered on 3/26/17at 11:06;  Start 3/26/17 at 08:15;  Stop 3/27/17 at 08:14

;  Status DC


Phenylephrine HCl 1 mg STK-MED ONCE IV ;  Start 3/26/17 at 09:30;  Stop 3/26/17 

at 09:31;  Status DC


Sevoflurane 90 ml 90 ml STK-MED ONCE IH ;  Start 3/26/17 at 09:30;  Stop 3/26/

17 at 09:31;  Status DC


Cefazolin Sodium/ Dextrose (Ancef 2gm Premix) 50 ml @  100 mls/hr 1X  ONCE IV  

Last administered on 3/26/17at 09:06;  Start 3/26/17 at 09:45;  Stop 3/26/17 at 

10:14;  Status DC


Vancomycin HCl 1 gm STK-MED ONCE .ROUTE ;  Start 3/26/17 at 10:02;  Stop 3/26/

17 at 10:03;  Status DC


Tobramycin Sulfate 1.2 gm STK-MED ONCE .ROUTE ;  Start 3/26/17 at 10:02;  Stop 3

/26/17 at 10:03;  Status DC


Morphine Sulfate (Morphine Preservative Free) 5 mg STK-MED ONCE .ROUTE ;  Start 

3/26/17 at 10:30;  Stop 3/26/17 at 10:31;  Status DC


Oxycodone HCl (Roxicodone) 5 mg PRN Q3HRS  PRN PO PAIN;  Start 3/26/17 at 10:45


Morphine Sulfate 2 mg PRN Q1HR  PRN IV PAIN;  Start 3/26/17 at 10:45


Fentanyl Citrate (Fentanyl 2ml Vial) 25 mcg PRN Q1HR  PRN IV PAIN Last 

administered on 3/27/17at 14:06;  Start 3/26/17 at 10:45;  Stop 3/27/17 at 15:35

;  Status DC


Acetaminophen (Tylenol) 500 mg TID PO  Last administered on 3/29/17at 09:28;  

Start 3/26/17 at 14:00


Multivitamins (Thera M Plus) 1 tab DAILY PO  Last administered on 3/29/17at 09:

27;  Start 3/27/17 at 09:00


Senna/Docusate Sodium (Senna Plus) 1 tab DAILY PO  Last administered on 3/29/

17at 09:27;  Start 3/27/17 at 09:00


Polyethylene Glycol (miraLAX PACKET) 17 gm PRN DAILY  PRN PO CONSTIPATION Last 

administered on 3/28/17at 10:03;  Start 3/26/17 at 10:45


Vitamin D 1000 unit 1,000 unit DAILY PO  Last administered on 3/29/17at 09:27;  

Start 3/27/17 at 09:00


Sodium Chloride (Iv Sodium Chloride 0.45%) 1,000 ml @  75 mls/hr [id number] IV ;  

Start 3/26/17 at 10:44;  Stop 3/26/17 at 20:39;  Status DC


Ondansetron HCl (Zofran) 4 mg PRN Q4HRS  PRN IV NAUSEA/VOMITING;  Start 3/26/17 

at 10:45


Aspirin (Kyle Aspirin) 325 mg BID PO ;  Start 3/26/17 at 21:00;  Status UNV


Magnesium Hydroxide (Milk Of Magnesia) 2,400 mg 1X PRN  PRN PO CONSTIPATION 

Last administered on 3/27/17at 21:55;  Start 3/27/17 at 06:00;  Stop 3/28/17 at 

05:59;  Status DC


Bisacodyl (Dulcolax Supp) 10 mg 1X PRN  PRN [facility] CONSTIPATION;  Start 3/27/17 at 

16:00;  Stop 3/28/17 at 15:59;  Status DC


Acetaminophen/ Hydrocodone Bitart (Lortab 7.5/325) 1 tab PRN Q4HRS  PRN PO PAIN

;  Start 3/26/17 at 10:45


Morphine Sulfate 4 mg PRN Q2HR  PRN IV PAIN;  Start 3/26/17 at 10:45


Acetaminophen/ Hydrocodone Bitart (Lortab 7.5/325) 2 tab PRN Q4HRS  PRN PO PAIN

;  Start 3/26/17 at 10:45


Dextrose 12.5 gm 12.5 gm PRN Q15MIN  PRN IV SEE COMMENTS;  Start 3/26/17 at 10:

45;  Stop 3/27/17 at 14:49;  Status DC


Cefazolin Sodium/ Dextrose 50 ml @  100 mls/hr Q6H IV ;  Start 3/26/17 at 11:00

;  Stop 3/26/17 at 23:29;  Status UNV


Clindamycin Phosphate (Cleocin 600 Mg Premix) 50 ml @  100 mls/hr Q6H IV  Last 

administered on 3/27/17at 03:17;  Start 3/26/17 at 15:00;  Stop 3/27/17 at 03:29

;  Status DC


Albuterol Sulfate (Ventolin Neb Soln) 2.5 mg 1X  ONCE NEB  Last administered on 

3/26/17at 12:01;  Start 3/26/17 at 11:45;  Stop 3/26/17 at 11:56;  Status DC


Enoxaparin Sodium (Lovenox 40mg Syringe) 40 mg Q24H SQ  Last administered on 3/

29/17at 09:29;  Start 3/27/17 at 08:00


Albuterol Sulfate (Ventolin Neb Soln) 2.5 mg PRN QID  PRN NEB WHEEZING Last 

administered on 3/27/17at 11:06;  Start 3/27/17 at 09:45


Fentanyl Citrate (Fentanyl 2ml Vial) 25 mcg PRN Q4HRS  PRN IV PAIN;  Start 3/27/

17 at 16:00


Pantoprazole Sodium (Protonix) 40 mg DAILYAC PO  Last administered on 3/29/17at 

09:27;  Start 3/29/17 at 07:30


Bisacodyl (Dulcolax Supp) 10 mg 1X  ONCE VA  Last administered on 3/28/17at 21:

10;  Start 3/28/17 at 20:15;  Stop 3/28/17 at 20:16;  Status DC





Active Scripts


Active


Reported


Toprol Xl (Metoprolol Succinate) 50 Mg Tab.er.24h 1 Tab PO HS


Losartan Potassium 50 Mg Tablet 50 Mg PO HS


Norvasc (Amlodipine Besylate) 5 Mg Tablet 1 Tab PO DAILY


Levothyroxine Sodium 125 Mcg Tablet 1 Tab PO DAILY


Vitals/I & O





 Vital Sign - Last 24 Hours








 3/28/17 3/28/17 3/28/17 3/28/17





 15:00 19:00 20:00 21:00


 


Temp 97.0 97.9  





 97.0 97.9  


 


Pulse 74 77  77


 


Resp 18 20  


 


B/P 122/50 134/56  134/56


 


Pulse Ox 91 90  


 


O2 Delivery Nasal Cannula Nasal Cannula Nasal Cannula 


 


O2 Flow Rate 3.0 3.0 3.0 


 


    





    





 3/28/17 3/28/17 3/29/17 3/29/17





 21:20 23:00 03:00 07:00


 


Temp  98.8 97.9 97.7





  98.8 97.9 97.7


 


Pulse 77 90 82 78


 


Resp  20 20 22


 


B/P  124/56 135/70 144/69


 


Pulse Ox  90 89 90


 


O2 Delivery  Nasal Cannula Nasal Cannula Nasal Cannula


 


O2 Flow Rate  3.0 3.0 3.0


 


    





    





 3/29/17 3/29/17  





 08:00 09:28  


 


Pulse  78  


 


B/P  144/69  


 


O2 Delivery Nasal Cannula   


 


O2 Flow Rate 3.0   














 Intake and Output   


 


 3/28/17 3/28/17 3/29/17





 15:00 23:00 07:00


 


Intake Total  0 ml 


 


Output Total   650 ml


 


Balance  0 ml -650 ml














ELIJAH DAVILA III DO Mar 29, 2017 12:47

## 2018-02-18 ENCOUNTER — HOSPITAL ENCOUNTER (INPATIENT)
Dept: HOSPITAL 61 - ER | Age: 83
LOS: 3 days | Discharge: NURSING, REHAB OR LONG-TERM CARE | DRG: 516 | End: 2018-02-21
Attending: INTERNAL MEDICINE | Admitting: INTERNAL MEDICINE
Payer: MEDICARE

## 2018-02-18 DIAGNOSIS — R09.02: ICD-10-CM

## 2018-02-18 DIAGNOSIS — Z88.0: ICD-10-CM

## 2018-02-18 DIAGNOSIS — M54.16: ICD-10-CM

## 2018-02-18 DIAGNOSIS — M48.061: ICD-10-CM

## 2018-02-18 DIAGNOSIS — Z96.642: ICD-10-CM

## 2018-02-18 DIAGNOSIS — Z88.8: ICD-10-CM

## 2018-02-18 DIAGNOSIS — M48.56XA: Primary | ICD-10-CM

## 2018-02-18 DIAGNOSIS — I10: ICD-10-CM

## 2018-02-18 DIAGNOSIS — M51.36: ICD-10-CM

## 2018-02-18 DIAGNOSIS — M51.37: ICD-10-CM

## 2018-02-18 DIAGNOSIS — M47.816: ICD-10-CM

## 2018-02-18 DIAGNOSIS — M19.90: ICD-10-CM

## 2018-02-18 DIAGNOSIS — Z91.018: ICD-10-CM

## 2018-02-18 DIAGNOSIS — E03.9: ICD-10-CM

## 2018-02-18 DIAGNOSIS — M85.80: ICD-10-CM

## 2018-02-18 DIAGNOSIS — N39.0: ICD-10-CM

## 2018-02-18 DIAGNOSIS — Z79.899: ICD-10-CM

## 2018-02-18 LAB
ADD MAN DIFF?: NO
ANION GAP SERPL CALC-SCNC: 6 MMOL/L (ref 6–14)
BACTERIA,URINE: (no result) /HPF
BASO #: 0.1 X10^3/UL (ref 0–0.2)
BASO %: 1 % (ref 0–3)
BILIRUBIN,URINE: NEGATIVE
BLOOD UREA NITROGEN: 22 MG/DL (ref 7–20)
CALCIUM: 8.9 MG/DL (ref 8.5–10.1)
CHLORIDE: 102 MMOL/L (ref 98–107)
CLARITY,URINE: (no result)
CO2 SERPL-SCNC: 31 MMOL/L (ref 21–32)
COLOR,URINE: YELLOW
CREAT SERPL-MCNC: 0.8 MG/DL (ref 0.6–1)
EOS #: 0.2 X10^3/UL (ref 0–0.7)
EOS %: 2 % (ref 0–3)
GFR SERPLBLD BASED ON 1.73 SQ M-ARVRAT: 67.7 ML/MIN
GLUCOSE SERPL-MCNC: 95 MG/DL (ref 70–99)
GLUCOSE,URINE: NEGATIVE MG/DL
HCG SERPL-ACNC: 7.7 X10^3/UL (ref 4–11)
HEMATOCRIT: 41.7 % (ref 36–47)
HEMOGLOBIN: 14 G/DL (ref 12–15.5)
LACTIC ACID: 0.6 MMOL/L (ref 0.4–2)
LACTIC ACID: 0.9 MMOL/L (ref 0.4–2)
LYMPH #: 2.3 X10^3/UL (ref 1–4.8)
LYMPH %: 30 % (ref 24–48)
MEAN CORPUSCULAR HEMOGLOBIN: 30 PG (ref 25–35)
MEAN CORPUSCULAR HGB CONC: 34 G/DL (ref 31–37)
MEAN CORPUSCULAR VOLUME: 89 FL (ref 79–100)
MONO #: 1.1 X10^3/UL (ref 0–1.1)
MONO %: 14 % (ref 0–9)
NEUT #: 4.1 X10^3UL (ref 1.8–7.7)
NEUT %: 53 % (ref 31–73)
NITRITE,URINE: NEGATIVE
PH,URINE: 8
PLATELET COUNT: 282 X10^3/UL (ref 140–400)
POTASSIUM SERPL-SCNC: 4 MMOL/L (ref 3.5–5.1)
PROTEIN,URINE: NEGATIVE MG/DL
RBC,URINE: (no result) /HPF (ref 0–2)
RED BLOOD COUNT: 4.7 X10^6/UL (ref 3.5–5.4)
RED CELL DISTRIBUTION WIDTH: 15.6 % (ref 11.5–14.5)
SODIUM: 139 MMOL/L (ref 136–145)
SPECIFIC GRAVITY,URINE: 1.01
SQUAMOUS EPITHELIAL CELL,UR: (no result) /LPF
UROBILINOGEN,URINE: 0.2 MG/DL
WBC,URINE: (no result) /HPF (ref 0–4)

## 2018-02-18 PROCEDURE — 72148 MRI LUMBAR SPINE W/O DYE: CPT

## 2018-02-18 PROCEDURE — 97530 THERAPEUTIC ACTIVITIES: CPT

## 2018-02-18 PROCEDURE — 72131 CT LUMBAR SPINE W/O DYE: CPT

## 2018-02-18 PROCEDURE — 85025 COMPLETE CBC W/AUTO DIFF WBC: CPT

## 2018-02-18 PROCEDURE — 87040 BLOOD CULTURE FOR BACTERIA: CPT

## 2018-02-18 PROCEDURE — 36415 COLL VENOUS BLD VENIPUNCTURE: CPT

## 2018-02-18 PROCEDURE — 97162 PT EVAL MOD COMPLEX 30 MIN: CPT

## 2018-02-18 PROCEDURE — 80048 BASIC METABOLIC PNL TOTAL CA: CPT

## 2018-02-18 PROCEDURE — 97166 OT EVAL MOD COMPLEX 45 MIN: CPT

## 2018-02-18 PROCEDURE — 82306 VITAMIN D 25 HYDROXY: CPT

## 2018-02-18 PROCEDURE — 81001 URINALYSIS AUTO W/SCOPE: CPT

## 2018-02-18 PROCEDURE — 85610 PROTHROMBIN TIME: CPT

## 2018-02-18 PROCEDURE — 99153 MOD SED SAME PHYS/QHP EA: CPT

## 2018-02-18 PROCEDURE — 87086 URINE CULTURE/COLONY COUNT: CPT

## 2018-02-18 PROCEDURE — 22514 PERQ VERTEBRAL AUGMENTATION: CPT

## 2018-02-18 PROCEDURE — 97164 PT RE-EVAL EST PLAN CARE: CPT

## 2018-02-18 PROCEDURE — 72192 CT PELVIS W/O DYE: CPT

## 2018-02-18 PROCEDURE — 83605 ASSAY OF LACTIC ACID: CPT

## 2018-02-18 PROCEDURE — 99152 MOD SED SAME PHYS/QHP 5/>YRS: CPT

## 2018-02-18 PROCEDURE — 97535 SELF CARE MNGMENT TRAINING: CPT

## 2018-02-18 RX ADMIN — MORPHINE SULFATE 1 MG: 4 INJECTION, SOLUTION INTRAMUSCULAR; INTRAVENOUS at 22:28

## 2018-02-18 RX ADMIN — FENTANYL CITRATE 1 MCG: 50 INJECTION INTRAMUSCULAR; INTRAVENOUS at 13:54

## 2018-02-18 RX ADMIN — LOSARTAN POTASSIUM 1 MG: 50 TABLET ORAL at 20:24

## 2018-02-18 RX ADMIN — METOPROLOL SUCCINATE 1 MG: 50 TABLET, EXTENDED RELEASE ORAL at 20:24

## 2018-02-18 RX ADMIN — FENTANYL CITRATE 1 MCG: 50 INJECTION INTRAMUSCULAR; INTRAVENOUS at 20:25

## 2018-02-18 RX ADMIN — FENTANYL CITRATE 1 MCG: 50 INJECTION INTRAMUSCULAR; INTRAVENOUS at 22:50

## 2018-02-19 LAB
ADD MAN DIFF?: NO
ANION GAP SERPL CALC-SCNC: 9 MMOL/L (ref 6–14)
BASO #: 0 X10^3/UL (ref 0–0.2)
BASO %: 0 % (ref 0–3)
BLOOD UREA NITROGEN: 26 MG/DL (ref 7–20)
CALCIUM: 8.6 MG/DL (ref 8.5–10.1)
CHLORIDE: 100 MMOL/L (ref 98–107)
CO2 SERPL-SCNC: 28 MMOL/L (ref 21–32)
CREAT SERPL-MCNC: 0.8 MG/DL (ref 0.6–1)
EOS #: 0.2 X10^3/UL (ref 0–0.7)
EOS %: 2 % (ref 0–3)
GFR SERPLBLD BASED ON 1.73 SQ M-ARVRAT: 67.7 ML/MIN
GLUCOSE SERPL-MCNC: 99 MG/DL (ref 70–99)
HCG SERPL-ACNC: 7.2 X10^3/UL (ref 4–11)
HEMATOCRIT: 39.4 % (ref 36–47)
HEMOGLOBIN: 13.4 G/DL (ref 12–15.5)
INR: 1 (ref 0.8–1.1)
LYMPH #: 1.6 X10^3/UL (ref 1–4.8)
LYMPH %: 23 % (ref 24–48)
MEAN CORPUSCULAR HEMOGLOBIN: 30 PG (ref 25–35)
MEAN CORPUSCULAR HGB CONC: 34 G/DL (ref 31–37)
MEAN CORPUSCULAR VOLUME: 88 FL (ref 79–100)
MONO #: 0.9 X10^3/UL (ref 0–1.1)
MONO %: 13 % (ref 0–9)
NEUT #: 4.4 X10^3UL (ref 1.8–7.7)
NEUT %: 62 % (ref 31–73)
PLATELET COUNT: 289 X10^3/UL (ref 140–400)
POTASSIUM SERPL-SCNC: 4.1 MMOL/L (ref 3.5–5.1)
PROTHROMBIN TIME PATIENT: 12.9 SEC (ref 11.7–14)
RED BLOOD COUNT: 4.48 X10^6/UL (ref 3.5–5.4)
RED CELL DISTRIBUTION WIDTH: 15 % (ref 11.5–14.5)
SODIUM: 137 MMOL/L (ref 136–145)

## 2018-02-19 RX ADMIN — CHOLECALCIFEROL CAP 125 MCG (5000 UNIT) 1 UNIT: 125 CAP at 15:21

## 2018-02-19 RX ADMIN — LOSARTAN POTASSIUM 1 MG: 50 TABLET ORAL at 21:09

## 2018-02-19 RX ADMIN — MORPHINE SULFATE 1 MG: 4 INJECTION, SOLUTION INTRAMUSCULAR; INTRAVENOUS at 10:17

## 2018-02-19 RX ADMIN — METOPROLOL SUCCINATE 1 MG: 50 TABLET, EXTENDED RELEASE ORAL at 21:09

## 2018-02-19 RX ADMIN — LEVOTHYROXINE SODIUM 1 MCG: 125 TABLET ORAL at 05:56

## 2018-02-19 RX ADMIN — ONDANSETRON 1 MG: 2 INJECTION INTRAMUSCULAR; INTRAVENOUS at 11:25

## 2018-02-20 PROCEDURE — 0QS03ZZ REPOSITION LUMBAR VERTEBRA, PERCUTANEOUS APPROACH: ICD-10-PCS

## 2018-02-20 PROCEDURE — 0QU03JZ SUPPLEMENT LUMBAR VERTEBRA WITH SYNTHETIC SUBSTITUTE, PERCUTANEOUS APPROACH: ICD-10-PCS

## 2018-02-20 RX ADMIN — CHOLECALCIFEROL CAP 125 MCG (5000 UNIT) 1 UNIT: 125 CAP at 09:00

## 2018-02-20 RX ADMIN — LEVOTHYROXINE SODIUM 1 MCG: 125 TABLET ORAL at 05:50

## 2018-02-20 RX ADMIN — ENALAPRILAT 1 MG: 1.25 INJECTION, SOLUTION INTRAVENOUS at 17:23

## 2018-02-20 RX ADMIN — IOHEXOL 1 ML: 240 INJECTION, SOLUTION INTRATHECAL; INTRAVASCULAR; INTRAVENOUS; ORAL at 14:00

## 2018-02-20 RX ADMIN — LOSARTAN POTASSIUM 1 MG: 50 TABLET ORAL at 22:56

## 2018-02-20 RX ADMIN — BACITRACIN 1 MLS/HR: 5000 INJECTION, POWDER, FOR SOLUTION INTRAMUSCULAR at 12:00

## 2018-02-20 RX ADMIN — MORPHINE SULFATE 1 MG: 4 INJECTION, SOLUTION INTRAMUSCULAR; INTRAVENOUS at 20:50

## 2018-02-20 RX ADMIN — METOPROLOL SUCCINATE 1 MG: 50 TABLET, EXTENDED RELEASE ORAL at 22:55

## 2018-02-20 RX ADMIN — FENTANYL CITRATE 1 MCG: 50 INJECTION INTRAMUSCULAR; INTRAVENOUS at 14:37

## 2018-02-20 RX ADMIN — LIDOCAINE HYDROCHLORIDE 5 ML: 10 INJECTION, SOLUTION INFILTRATION; PERINEURAL at 13:45

## 2018-02-20 RX ADMIN — VANCOMYCIN HYDROCHLORIDE 1 MLS/HR: 1 INJECTION, POWDER, FOR SOLUTION INTRAVENOUS at 14:15

## 2018-02-20 RX ADMIN — MORPHINE SULFATE 1 MG: 4 INJECTION, SOLUTION INTRAMUSCULAR; INTRAVENOUS at 02:58

## 2018-02-20 RX ADMIN — MIDAZOLAM HYDROCHLORIDE 1 MG: 1 INJECTION, SOLUTION INTRAMUSCULAR; INTRAVENOUS at 14:37

## 2018-02-20 RX ADMIN — HYDRALAZINE HYDROCHLORIDE 1 MG: 20 INJECTION INTRAMUSCULAR; INTRAVENOUS at 15:59

## 2018-02-21 LAB
ADD MAN DIFF?: NO
ANION GAP SERPL CALC-SCNC: 10 MMOL/L (ref 6–14)
BASO #: 0 X10^3/UL (ref 0–0.2)
BASO %: 0 % (ref 0–3)
BLOOD UREA NITROGEN: 16 MG/DL (ref 7–20)
CALCIUM: 8.8 MG/DL (ref 8.5–10.1)
CHLORIDE: 99 MMOL/L (ref 98–107)
CO2 SERPL-SCNC: 25 MMOL/L (ref 21–32)
CREAT SERPL-MCNC: 0.9 MG/DL (ref 0.6–1)
EOS #: 0.1 X10^3/UL (ref 0–0.7)
EOS %: 1 % (ref 0–3)
GFR SERPLBLD BASED ON 1.73 SQ M-ARVRAT: 59.1 ML/MIN
GLUCOSE SERPL-MCNC: 92 MG/DL (ref 70–99)
HCG SERPL-ACNC: 7.7 X10^3/UL (ref 4–11)
HEMATOCRIT: 42.3 % (ref 36–47)
HEMOGLOBIN: 14.2 G/DL (ref 12–15.5)
LYMPH #: 1.6 X10^3/UL (ref 1–4.8)
LYMPH %: 20 % (ref 24–48)
MEAN CORPUSCULAR HEMOGLOBIN: 30 PG (ref 25–35)
MEAN CORPUSCULAR HGB CONC: 34 G/DL (ref 31–37)
MEAN CORPUSCULAR VOLUME: 88 FL (ref 79–100)
MONO #: 1 X10^3/UL (ref 0–1.1)
MONO %: 12 % (ref 0–9)
NEUT #: 5 X10^3UL (ref 1.8–7.7)
NEUT %: 66 % (ref 31–73)
PLATELET COUNT: 298 X10^3/UL (ref 140–400)
POTASSIUM SERPL-SCNC: 3.8 MMOL/L (ref 3.5–5.1)
RED BLOOD COUNT: 4.83 X10^6/UL (ref 3.5–5.4)
RED CELL DISTRIBUTION WIDTH: 15.1 % (ref 11.5–14.5)
SODIUM: 134 MMOL/L (ref 136–145)

## 2018-02-21 RX ADMIN — CHOLECALCIFEROL CAP 125 MCG (5000 UNIT) 1 UNIT: 125 CAP at 08:11

## 2018-02-21 RX ADMIN — HYDRALAZINE HYDROCHLORIDE 1 MG: 20 INJECTION INTRAMUSCULAR; INTRAVENOUS at 03:50

## 2018-02-21 RX ADMIN — ONDANSETRON 1 MG: 2 INJECTION INTRAMUSCULAR; INTRAVENOUS at 04:59

## 2018-02-21 RX ADMIN — BISACODYL 1 MG: 5 TABLET, COATED ORAL at 10:55

## 2018-02-21 RX ADMIN — LEVOTHYROXINE SODIUM 1 MCG: 125 TABLET ORAL at 06:18

## 2018-02-21 RX ADMIN — SENNOSIDES AND DOCUSATE SODIUM 1 TAB: 8.6; 5 TABLET ORAL at 10:55
